# Patient Record
Sex: FEMALE
[De-identification: names, ages, dates, MRNs, and addresses within clinical notes are randomized per-mention and may not be internally consistent; named-entity substitution may affect disease eponyms.]

---

## 2017-02-22 ENCOUNTER — APPOINTMENT (OUTPATIENT)
Dept: GASTROENTEROLOGY | Facility: CLINIC | Age: 47
End: 2017-02-22

## 2017-11-24 ENCOUNTER — INPATIENT (INPATIENT)
Facility: HOSPITAL | Age: 47
LOS: 10 days | Discharge: HOME | DRG: 389 | End: 2017-12-05
Attending: SURGERY | Admitting: SURGERY
Payer: MEDICAID

## 2017-11-24 VITALS
RESPIRATION RATE: 20 BRPM | WEIGHT: 110.01 LBS | OXYGEN SATURATION: 100 % | DIASTOLIC BLOOD PRESSURE: 84 MMHG | HEIGHT: 62 IN | SYSTOLIC BLOOD PRESSURE: 128 MMHG | TEMPERATURE: 98 F | HEART RATE: 96 BPM

## 2017-11-24 DIAGNOSIS — Z90.49 ACQUIRED ABSENCE OF OTHER SPECIFIED PARTS OF DIGESTIVE TRACT: Chronic | ICD-10-CM

## 2017-11-24 LAB
ALBUMIN SERPL ELPH-MCNC: 4 G/DL — SIGNIFICANT CHANGE UP (ref 3.3–5)
ALP SERPL-CCNC: 67 U/L — SIGNIFICANT CHANGE UP (ref 40–120)
ALT FLD-CCNC: 9 U/L — LOW (ref 10–45)
ANION GAP SERPL CALC-SCNC: 14 MMOL/L — SIGNIFICANT CHANGE UP (ref 5–17)
APPEARANCE UR: CLEAR — SIGNIFICANT CHANGE UP
AST SERPL-CCNC: 13 U/L — SIGNIFICANT CHANGE UP (ref 10–40)
BASOPHILS NFR BLD AUTO: 0.2 % — SIGNIFICANT CHANGE UP (ref 0–2)
BILIRUB SERPL-MCNC: 0.3 MG/DL — SIGNIFICANT CHANGE UP (ref 0.2–1.2)
BILIRUB UR-MCNC: NEGATIVE — SIGNIFICANT CHANGE UP
BUN SERPL-MCNC: 17 MG/DL — SIGNIFICANT CHANGE UP (ref 7–23)
CALCIUM SERPL-MCNC: 9.4 MG/DL — SIGNIFICANT CHANGE UP (ref 8.4–10.5)
CHLORIDE SERPL-SCNC: 98 MMOL/L — SIGNIFICANT CHANGE UP (ref 96–108)
CO2 SERPL-SCNC: 23 MMOL/L — SIGNIFICANT CHANGE UP (ref 22–31)
COLOR SPEC: YELLOW — SIGNIFICANT CHANGE UP
CREAT SERPL-MCNC: 0.58 MG/DL — SIGNIFICANT CHANGE UP (ref 0.5–1.3)
DIFF PNL FLD: NEGATIVE — SIGNIFICANT CHANGE UP
EOSINOPHIL NFR BLD AUTO: 0.9 % — SIGNIFICANT CHANGE UP (ref 0–6)
GLUCOSE SERPL-MCNC: 210 MG/DL — HIGH (ref 70–99)
GLUCOSE UR QL: NEGATIVE — SIGNIFICANT CHANGE UP
HCG UR QL: NEGATIVE — SIGNIFICANT CHANGE UP
HCT VFR BLD CALC: 33.7 % — LOW (ref 34.5–45)
HGB BLD-MCNC: 10.7 G/DL — LOW (ref 11.5–15.5)
KETONES UR-MCNC: 15 MG/DL
LACTATE SERPL-SCNC: 0.9 MMOL/L — SIGNIFICANT CHANGE UP (ref 0.5–2)
LEUKOCYTE ESTERASE UR-ACNC: NEGATIVE — SIGNIFICANT CHANGE UP
LIDOCAIN IGE QN: 33 U/L — SIGNIFICANT CHANGE UP (ref 7–60)
LYMPHOCYTES # BLD AUTO: 9.2 % — LOW (ref 13–44)
MCHC RBC-ENTMCNC: 24.5 PG — LOW (ref 27–34)
MCHC RBC-ENTMCNC: 31.8 G/DL — LOW (ref 32–36)
MCV RBC AUTO: 77.3 FL — LOW (ref 80–100)
MONOCYTES NFR BLD AUTO: 5.9 % — SIGNIFICANT CHANGE UP (ref 2–14)
NEUTROPHILS NFR BLD AUTO: 83.8 % — HIGH (ref 43–77)
NITRITE UR-MCNC: NEGATIVE — SIGNIFICANT CHANGE UP
PH UR: 7.5 — SIGNIFICANT CHANGE UP (ref 5–8)
PLATELET # BLD AUTO: 265 K/UL — SIGNIFICANT CHANGE UP (ref 150–400)
POTASSIUM SERPL-MCNC: 3.6 MMOL/L — SIGNIFICANT CHANGE UP (ref 3.5–5.3)
POTASSIUM SERPL-SCNC: 3.6 MMOL/L — SIGNIFICANT CHANGE UP (ref 3.5–5.3)
PROT SERPL-MCNC: 7.7 G/DL — SIGNIFICANT CHANGE UP (ref 6–8.3)
PROT UR-MCNC: (no result) MG/DL
RBC # BLD: 4.36 M/UL — SIGNIFICANT CHANGE UP (ref 3.8–5.2)
RBC # FLD: 16.2 % — SIGNIFICANT CHANGE UP (ref 10.3–16.9)
SODIUM SERPL-SCNC: 135 MMOL/L — SIGNIFICANT CHANGE UP (ref 135–145)
SP GR SPEC: 1.02 — SIGNIFICANT CHANGE UP (ref 1–1.03)
UROBILINOGEN FLD QL: 0.2 E.U./DL — SIGNIFICANT CHANGE UP
WBC # BLD: 8.8 K/UL — SIGNIFICANT CHANGE UP (ref 3.8–10.5)
WBC # FLD AUTO: 8.8 K/UL — SIGNIFICANT CHANGE UP (ref 3.8–10.5)

## 2017-11-24 PROCEDURE — 74177 CT ABD & PELVIS W/CONTRAST: CPT | Mod: 26

## 2017-11-24 PROCEDURE — 99285 EMERGENCY DEPT VISIT HI MDM: CPT

## 2017-11-24 RX ORDER — IOHEXOL 300 MG/ML
50 INJECTION, SOLUTION INTRAVENOUS ONCE
Qty: 0 | Refills: 0 | Status: COMPLETED | OUTPATIENT
Start: 2017-11-24 | End: 2017-11-24

## 2017-11-24 RX ORDER — IOHEXOL 300 MG/ML
50 INJECTION, SOLUTION INTRAVENOUS ONCE
Qty: 0 | Refills: 0 | Status: DISCONTINUED | OUTPATIENT
Start: 2017-11-24 | End: 2017-11-24

## 2017-11-24 RX ORDER — HEPARIN SODIUM 5000 [USP'U]/ML
5000 INJECTION INTRAVENOUS; SUBCUTANEOUS EVERY 8 HOURS
Qty: 0 | Refills: 0 | Status: DISCONTINUED | OUTPATIENT
Start: 2017-11-24 | End: 2017-12-05

## 2017-11-24 RX ORDER — FAMOTIDINE 10 MG/ML
20 INJECTION INTRAVENOUS ONCE
Qty: 0 | Refills: 0 | Status: COMPLETED | OUTPATIENT
Start: 2017-11-24 | End: 2017-11-24

## 2017-11-24 RX ORDER — ONDANSETRON 8 MG/1
4 TABLET, FILM COATED ORAL EVERY 6 HOURS
Qty: 0 | Refills: 0 | Status: DISCONTINUED | OUTPATIENT
Start: 2017-11-24 | End: 2017-12-05

## 2017-11-24 RX ORDER — MORPHINE SULFATE 50 MG/1
4 CAPSULE, EXTENDED RELEASE ORAL ONCE
Qty: 0 | Refills: 0 | Status: DISCONTINUED | OUTPATIENT
Start: 2017-11-24 | End: 2017-11-24

## 2017-11-24 RX ORDER — LIDOCAINE 4 G/100G
10 CREAM TOPICAL ONCE
Qty: 0 | Refills: 0 | Status: COMPLETED | OUTPATIENT
Start: 2017-11-24 | End: 2017-11-24

## 2017-11-24 RX ORDER — HYDROMORPHONE HYDROCHLORIDE 2 MG/ML
0.5 INJECTION INTRAMUSCULAR; INTRAVENOUS; SUBCUTANEOUS EVERY 4 HOURS
Qty: 0 | Refills: 0 | Status: DISCONTINUED | OUTPATIENT
Start: 2017-11-24 | End: 2017-12-01

## 2017-11-24 RX ORDER — HYDROMORPHONE HYDROCHLORIDE 2 MG/ML
1 INJECTION INTRAMUSCULAR; INTRAVENOUS; SUBCUTANEOUS EVERY 4 HOURS
Qty: 0 | Refills: 0 | Status: DISCONTINUED | OUTPATIENT
Start: 2017-11-24 | End: 2017-12-01

## 2017-11-24 RX ORDER — SODIUM CHLORIDE 9 MG/ML
1000 INJECTION INTRAMUSCULAR; INTRAVENOUS; SUBCUTANEOUS ONCE
Qty: 0 | Refills: 0 | Status: COMPLETED | OUTPATIENT
Start: 2017-11-24 | End: 2017-11-24

## 2017-11-24 RX ORDER — SODIUM CHLORIDE 9 MG/ML
1000 INJECTION, SOLUTION INTRAVENOUS
Qty: 0 | Refills: 0 | Status: DISCONTINUED | OUTPATIENT
Start: 2017-11-24 | End: 2017-11-26

## 2017-11-24 RX ORDER — ONDANSETRON 8 MG/1
4 TABLET, FILM COATED ORAL ONCE
Qty: 0 | Refills: 0 | Status: COMPLETED | OUTPATIENT
Start: 2017-11-24 | End: 2017-11-24

## 2017-11-24 RX ORDER — MORPHINE SULFATE 50 MG/1
8 CAPSULE, EXTENDED RELEASE ORAL ONCE
Qty: 0 | Refills: 0 | Status: DISCONTINUED | OUTPATIENT
Start: 2017-11-24 | End: 2017-11-24

## 2017-11-24 RX ADMIN — IOHEXOL 50 MILLILITER(S): 300 INJECTION, SOLUTION INTRAVENOUS at 21:00

## 2017-11-24 RX ADMIN — HYDROMORPHONE HYDROCHLORIDE 1 MILLIGRAM(S): 2 INJECTION INTRAMUSCULAR; INTRAVENOUS; SUBCUTANEOUS at 23:00

## 2017-11-24 RX ADMIN — SODIUM CHLORIDE 100 MILLILITER(S): 9 INJECTION, SOLUTION INTRAVENOUS at 22:52

## 2017-11-24 RX ADMIN — ONDANSETRON 4 MILLIGRAM(S): 8 TABLET, FILM COATED ORAL at 16:31

## 2017-11-24 RX ADMIN — SODIUM CHLORIDE 1000 MILLILITER(S): 9 INJECTION INTRAMUSCULAR; INTRAVENOUS; SUBCUTANEOUS at 16:25

## 2017-11-24 RX ADMIN — LIDOCAINE 10 MILLILITER(S): 4 CREAM TOPICAL at 16:31

## 2017-11-24 RX ADMIN — MORPHINE SULFATE 4 MILLIGRAM(S): 50 CAPSULE, EXTENDED RELEASE ORAL at 16:31

## 2017-11-24 RX ADMIN — MORPHINE SULFATE 4 MILLIGRAM(S): 50 CAPSULE, EXTENDED RELEASE ORAL at 20:59

## 2017-11-24 RX ADMIN — Medication 30 MILLILITER(S): at 16:31

## 2017-11-24 RX ADMIN — FAMOTIDINE 20 MILLIGRAM(S): 10 INJECTION INTRAVENOUS at 16:31

## 2017-11-24 RX ADMIN — HYDROMORPHONE HYDROCHLORIDE 1 MILLIGRAM(S): 2 INJECTION INTRAMUSCULAR; INTRAVENOUS; SUBCUTANEOUS at 22:07

## 2017-11-24 RX ADMIN — MORPHINE SULFATE 8 MILLIGRAM(S): 50 CAPSULE, EXTENDED RELEASE ORAL at 20:59

## 2017-11-24 RX ADMIN — MORPHINE SULFATE 8 MILLIGRAM(S): 50 CAPSULE, EXTENDED RELEASE ORAL at 17:17

## 2017-11-24 RX ADMIN — HEPARIN SODIUM 5000 UNIT(S): 5000 INJECTION INTRAVENOUS; SUBCUTANEOUS at 22:52

## 2017-11-24 NOTE — ED ADULT NURSE NOTE - OBJECTIVE STATEMENT
Pt presents to ED c/o abdominal pain since today. Pt endorses 9/10 periumbilical pain starting 12 hours ago, pt endorses associated nausea no vomiting,. Pt presents to ED c/o abdominal pain since today. Pt with PMH colectomy and x2 SBOs presents today endorses 9/10 medial abdominal pain, sharp, constant since last night. Pt endorses nausea, no vomiting. Pt with last BM yesterday normal per pt, none today. Pt denies fever, chills, CP, SOB, difficulty urinating, blood in urine, vaginal bleeding. Pt presents anxious but in NAD ambulatory through triage.

## 2017-11-24 NOTE — ED PROVIDER NOTE - PHYSICAL EXAMINATION
VITAL SIGNS: I have reviewed nursing notes and confirm.  CONSTITUTIONAL: Well-developed; well-nourished; in no acute distress.  SKIN: Agree with RN documentation regarding decubitus evaluation. Remainder of skin exam is warm and dry, no acute rash.  HEAD: Normocephalic; atraumatic.  EYES: PERRL, EOM intact; conjunctiva and sclera clear.  ENT: No nasal discharge; airway clear.  NECK: Supple; non tender.  CARD: S1, S2 normal; no murmurs, gallops, or rubs. Regular rate and rhythm.  RESP: No wheezes, rales or rhonchi.  ABD: Normal bowel sounds; soft; + mild distention, + TTP mid-epigastrium w/guarding, no rigidity, no mcburney's pt TTP, no fluid wave  EXT: Normal ROM. No clubbing, cyanosis or edema.  LYMPH: No acute cervical adenopathy.  NEURO: Alert, oriented. Grossly unremarkable.  PSYCH: Cooperative, appropriate.

## 2017-11-24 NOTE — H&P ADULT - HISTORY OF PRESENT ILLNESS
46 yo F with hx of colonic resection 4 years ago for unknown infection, hx of SBOs x 2, presents with LUQ pain since last night. The pain is upper midline to LUQ and she had had pain in a similar location before but never this severe. The pain hasn't travelled. She's had a small amount of nausea but no vomiting. She is not currently nauseated. She is still passing gas and having bowel movement. No fever/chills. Her surgery was in [place]. 46 yo F with hx of colonic resection 4 years ago for unknown infection, hx of SBOs x 2, presents with LUQ pain since last night. The pain is upper midline to LUQ and she had had pain in a similar location before but never this severe. The pain hasn't travelled. She's had a small amount of nausea but no vomiting. She is not currently nauseated. She is still passing gas and having bowel movement. No fever/chills. Her surgery was in [place]. She denies any history of malignancy.

## 2017-11-24 NOTE — H&P ADULT - ASSESSMENT
48 yo F with hx left hemicolectomy presents with LUQ pain and is found to have narrowing at the level of the sigmoid.   Admit to regional  NPO/IVF  Pain/nausea control  SCD/SQH  Repeat CT scan with PO and rectal contrast- discussed case in person with radiologists. Concern could be a possible mass vs. possible inflammatory process.   OOBA/IS    Patient seen and discussed with chief.

## 2017-11-24 NOTE — H&P ADULT - NSHPLABSRESULTS_GEN_ALL_CORE
10.7   8.8   )-----------( 265      ( 24 Nov 2017 17:10 )             33.7   24 Nov 2017 17:10    135    |  98     |  17     ----------------------------<  210    3.6     |  23     |  0.58     Ca    9.4        24 Nov 2017 17:10    TPro  7.7    /  Alb  4.0    /  TBili  0.3    /  DBili  x      /  AST  13     /  ALT  9      /  AlkPhos  67     24 Nov 2017 17:10    < from: CT Abdomen and Pelvis w/ IV Cont (11.24.17 @ 19:48) >      ******PRELIMINARY REPORT******    ******PRELIMINARY REPORT******            EXAM:  CT ABDOMEN AND PELVIS IC                          PROCEDURE DATE:  11/24/2017        CT of the ABDOMEN and PELVIS with intravenous contrast dated 11/24/2017   7:48 PM    FINDINGS: Images of the lower chest demonstrate mild bibasilar dependent   atelectasis.    There is a 3 mm hypoattenuating lesion in hepatic segment VII that is too   small to further characterize. No radiopaque stones are seen within the   gallbladder. There is 10 mm dilatation of the common bile duct. The   pancreatic duct is dilated measuring up to 5 mm at the pancreatic head.   The pancreas is normal in appearance. No splenic abnormalities are seen.    The adrenal glands are unremarkable. Subcentimeter hypoattenuating   lesions in both kidneys are too small to further characterize. No   hydronephrosis or nephrolithiasis.    No abdominal aortic aneurysm is seen. No lymphadenopathy is seen.    Evaluation of the bowel demonstrates patient to be status post left   hemicolectomy with small bowel-to-colonic anastomosis in the right upper   quadrant. There is narrowing of the sigmoid colon with proximal   distention which may be secondary to underdistention, although   mass/stricture cannot be excluded. Proximal bowel is intermittently fluid   and air-filled measuring up to 3.7 cm within small bowel loops of the   right lower quadrant. There is small bowel wall thickening in the left   upper quadrant which may represent underdistention versus enteritis. This   constellation of findings may suggest sequelae of inflammatory bowel   disease and correlation with patient history is recommended.    Images of the pelvis demonstrate the uterus to be retroverted and lobular   in contour, likely related to fibroids. No adnexal masses are seen.    Evaluation of the osseous structures demonstrates mild degenerative   changes.      IMPRESSION:  1.  Status post left hemicolectomy with small bowel to colonic   anastomosis. There is large and small bowel dilatation proximal to an   area of narrowing within the sigmoid colon which may be secondary to   underdistention although mass/stricture cannot be excluded. Apparent   jejunal wall thickening in theleft upper quadrant may represent   additional underdistention versus enteritis. Repeat examination with oral   and rectal contrast may be of use for further evaluation.  2.  Dilatation of the common bile duct as well as pancreatic duct. ERCP   or MRCP may be of use for further evaluation.    The above results were discussed with Dr. Sharda Capone on 11/24/2017   at 8:09 PM.        ******PRELIMINARY REPORT******       PADMINI SALDIVAR M.D., RADIOLOGY RESIDENT                        < end of copied text >

## 2017-11-24 NOTE — ED PROVIDER NOTE - MEDICAL DECISION MAKING DETAILS
+ SBO on CT though there are several potential sites of obstruction, pt to receive triple contrast CT in order to further delineate strictures/lesions of colon. Pain well controlled in ED, admitting to surgical service under Dr. He.

## 2017-11-24 NOTE — H&P ADULT - NSHPPHYSICALEXAM_GEN_ALL_CORE
Anxious due to pain  Breathing nonlabored  Abdomen soft, ttp along superior incision and in LUQ. No hernias or fascial defects palpated.   Extremities warm and well-perfused

## 2017-11-24 NOTE — ED PROVIDER NOTE - OBJECTIVE STATEMENT
48 y/o F w/hx bowel resection many years ago for ?infection (exact diagnosis unknown to pt) w/2 SBO's in the past managed medically, p/w abd distention, pain, and nausea for past 12 hrs. Pain is 10/10 angela-umbilical non-radiating sharp pain. No back of flank pain. Discussed with Dr. [place] on the phone and told to report to the ed for concerns of SBO. States she had BM yesterday, small amount of flatus today. No urinary sx. No fever/chills. No CP/SOB/palpitations. No vaginal complaints/bleeding.

## 2017-11-25 LAB
ALBUMIN SERPL ELPH-MCNC: 3.5 G/DL — SIGNIFICANT CHANGE UP (ref 3.3–5)
ALP SERPL-CCNC: 54 U/L — SIGNIFICANT CHANGE UP (ref 40–120)
ALT FLD-CCNC: 7 U/L — LOW (ref 10–45)
ANION GAP SERPL CALC-SCNC: 15 MMOL/L — SIGNIFICANT CHANGE UP (ref 5–17)
AST SERPL-CCNC: 12 U/L — SIGNIFICANT CHANGE UP (ref 10–40)
BILIRUB SERPL-MCNC: 0.3 MG/DL — SIGNIFICANT CHANGE UP (ref 0.2–1.2)
BUN SERPL-MCNC: 13 MG/DL — SIGNIFICANT CHANGE UP (ref 7–23)
CALCIUM SERPL-MCNC: 8.1 MG/DL — LOW (ref 8.4–10.5)
CHLORIDE SERPL-SCNC: 103 MMOL/L — SIGNIFICANT CHANGE UP (ref 96–108)
CO2 SERPL-SCNC: 21 MMOL/L — LOW (ref 22–31)
CREAT SERPL-MCNC: 0.5 MG/DL — SIGNIFICANT CHANGE UP (ref 0.5–1.3)
CULTURE RESULTS: NO GROWTH — SIGNIFICANT CHANGE UP
GLUCOSE SERPL-MCNC: 79 MG/DL — SIGNIFICANT CHANGE UP (ref 70–99)
HCT VFR BLD CALC: 27.6 % — LOW (ref 34.5–45)
HGB BLD-MCNC: 8.8 G/DL — LOW (ref 11.5–15.5)
MAGNESIUM SERPL-MCNC: 2.1 MG/DL — SIGNIFICANT CHANGE UP (ref 1.6–2.6)
MCHC RBC-ENTMCNC: 24.6 PG — LOW (ref 27–34)
MCHC RBC-ENTMCNC: 31.9 G/DL — LOW (ref 32–36)
MCV RBC AUTO: 77.3 FL — LOW (ref 80–100)
PHOSPHATE SERPL-MCNC: 2.8 MG/DL — SIGNIFICANT CHANGE UP (ref 2.5–4.5)
PLATELET # BLD AUTO: 219 K/UL — SIGNIFICANT CHANGE UP (ref 150–400)
POTASSIUM SERPL-MCNC: 3.1 MMOL/L — LOW (ref 3.5–5.3)
POTASSIUM SERPL-SCNC: 3.1 MMOL/L — LOW (ref 3.5–5.3)
PROT SERPL-MCNC: 6.1 G/DL — SIGNIFICANT CHANGE UP (ref 6–8.3)
RBC # BLD: 3.57 M/UL — LOW (ref 3.8–5.2)
RBC # FLD: 16.3 % — SIGNIFICANT CHANGE UP (ref 10.3–16.9)
SODIUM SERPL-SCNC: 139 MMOL/L — SIGNIFICANT CHANGE UP (ref 135–145)
SPECIMEN SOURCE: SIGNIFICANT CHANGE UP
WBC # BLD: 4.7 K/UL — SIGNIFICANT CHANGE UP (ref 3.8–10.5)
WBC # FLD AUTO: 4.7 K/UL — SIGNIFICANT CHANGE UP (ref 3.8–10.5)

## 2017-11-25 PROCEDURE — 74177 CT ABD & PELVIS W/CONTRAST: CPT | Mod: 26

## 2017-11-25 RX ORDER — HYDROMORPHONE HYDROCHLORIDE 2 MG/ML
1 INJECTION INTRAMUSCULAR; INTRAVENOUS; SUBCUTANEOUS ONCE
Qty: 0 | Refills: 0 | Status: DISCONTINUED | OUTPATIENT
Start: 2017-11-25 | End: 2017-11-25

## 2017-11-25 RX ORDER — DIPHENHYDRAMINE HCL 50 MG
50 CAPSULE ORAL EVERY 6 HOURS
Qty: 0 | Refills: 0 | Status: DISCONTINUED | OUTPATIENT
Start: 2017-11-25 | End: 2017-11-25

## 2017-11-25 RX ORDER — DIATRIZOATE MEGLUMINE 180 MG/ML
30 INJECTION, SOLUTION INTRAVESICAL ONCE
Qty: 0 | Refills: 0 | Status: COMPLETED | OUTPATIENT
Start: 2017-11-25 | End: 2017-11-25

## 2017-11-25 RX ORDER — DIPHENHYDRAMINE HCL 50 MG
25 CAPSULE ORAL ONCE
Qty: 0 | Refills: 0 | Status: COMPLETED | OUTPATIENT
Start: 2017-11-25 | End: 2017-11-25

## 2017-11-25 RX ORDER — POTASSIUM CHLORIDE 20 MEQ
10 PACKET (EA) ORAL
Qty: 0 | Refills: 0 | Status: COMPLETED | OUTPATIENT
Start: 2017-11-25 | End: 2017-11-25

## 2017-11-25 RX ORDER — DIPHENHYDRAMINE HCL 50 MG
50 CAPSULE ORAL ONCE
Qty: 0 | Refills: 0 | Status: COMPLETED | OUTPATIENT
Start: 2017-11-25 | End: 2017-11-25

## 2017-11-25 RX ORDER — HYDROMORPHONE HYDROCHLORIDE 2 MG/ML
0.5 INJECTION INTRAMUSCULAR; INTRAVENOUS; SUBCUTANEOUS ONCE
Qty: 0 | Refills: 0 | Status: DISCONTINUED | OUTPATIENT
Start: 2017-11-25 | End: 2017-11-25

## 2017-11-25 RX ORDER — POTASSIUM PHOSPHATE, MONOBASIC POTASSIUM PHOSPHATE, DIBASIC 236; 224 MG/ML; MG/ML
15 INJECTION, SOLUTION INTRAVENOUS ONCE
Qty: 0 | Refills: 0 | Status: COMPLETED | OUTPATIENT
Start: 2017-11-25 | End: 2017-11-25

## 2017-11-25 RX ADMIN — HYDROMORPHONE HYDROCHLORIDE 1 MILLIGRAM(S): 2 INJECTION INTRAMUSCULAR; INTRAVENOUS; SUBCUTANEOUS at 16:15

## 2017-11-25 RX ADMIN — HYDROMORPHONE HYDROCHLORIDE 1 MILLIGRAM(S): 2 INJECTION INTRAMUSCULAR; INTRAVENOUS; SUBCUTANEOUS at 23:59

## 2017-11-25 RX ADMIN — HEPARIN SODIUM 5000 UNIT(S): 5000 INJECTION INTRAVENOUS; SUBCUTANEOUS at 05:50

## 2017-11-25 RX ADMIN — SODIUM CHLORIDE 100 MILLILITER(S): 9 INJECTION, SOLUTION INTRAVENOUS at 18:58

## 2017-11-25 RX ADMIN — HYDROMORPHONE HYDROCHLORIDE 1 MILLIGRAM(S): 2 INJECTION INTRAMUSCULAR; INTRAVENOUS; SUBCUTANEOUS at 01:01

## 2017-11-25 RX ADMIN — HYDROMORPHONE HYDROCHLORIDE 1 MILLIGRAM(S): 2 INJECTION INTRAMUSCULAR; INTRAVENOUS; SUBCUTANEOUS at 12:25

## 2017-11-25 RX ADMIN — HYDROMORPHONE HYDROCHLORIDE 0.5 MILLIGRAM(S): 2 INJECTION INTRAMUSCULAR; INTRAVENOUS; SUBCUTANEOUS at 03:12

## 2017-11-25 RX ADMIN — HYDROMORPHONE HYDROCHLORIDE 1 MILLIGRAM(S): 2 INJECTION INTRAMUSCULAR; INTRAVENOUS; SUBCUTANEOUS at 00:19

## 2017-11-25 RX ADMIN — HEPARIN SODIUM 5000 UNIT(S): 5000 INJECTION INTRAVENOUS; SUBCUTANEOUS at 22:00

## 2017-11-25 RX ADMIN — Medication 100 MILLIEQUIVALENT(S): at 16:51

## 2017-11-25 RX ADMIN — HYDROMORPHONE HYDROCHLORIDE 0.5 MILLIGRAM(S): 2 INJECTION INTRAMUSCULAR; INTRAVENOUS; SUBCUTANEOUS at 03:02

## 2017-11-25 RX ADMIN — HYDROMORPHONE HYDROCHLORIDE 1 MILLIGRAM(S): 2 INJECTION INTRAMUSCULAR; INTRAVENOUS; SUBCUTANEOUS at 19:59

## 2017-11-25 RX ADMIN — HEPARIN SODIUM 5000 UNIT(S): 5000 INJECTION INTRAVENOUS; SUBCUTANEOUS at 13:12

## 2017-11-25 RX ADMIN — Medication 100 MILLIEQUIVALENT(S): at 10:10

## 2017-11-25 RX ADMIN — HYDROMORPHONE HYDROCHLORIDE 1 MILLIGRAM(S): 2 INJECTION INTRAMUSCULAR; INTRAVENOUS; SUBCUTANEOUS at 08:40

## 2017-11-25 RX ADMIN — HYDROMORPHONE HYDROCHLORIDE 1 MILLIGRAM(S): 2 INJECTION INTRAMUSCULAR; INTRAVENOUS; SUBCUTANEOUS at 08:28

## 2017-11-25 RX ADMIN — POTASSIUM PHOSPHATE, MONOBASIC POTASSIUM PHOSPHATE, DIBASIC 62.5 MILLIMOLE(S): 236; 224 INJECTION, SOLUTION INTRAVENOUS at 18:53

## 2017-11-25 RX ADMIN — Medication 50 MILLIGRAM(S): at 16:58

## 2017-11-25 RX ADMIN — HYDROMORPHONE HYDROCHLORIDE 1 MILLIGRAM(S): 2 INJECTION INTRAMUSCULAR; INTRAVENOUS; SUBCUTANEOUS at 12:07

## 2017-11-25 RX ADMIN — Medication 100 MILLIEQUIVALENT(S): at 11:53

## 2017-11-25 RX ADMIN — HYDROMORPHONE HYDROCHLORIDE 1 MILLIGRAM(S): 2 INJECTION INTRAMUSCULAR; INTRAVENOUS; SUBCUTANEOUS at 20:15

## 2017-11-25 RX ADMIN — Medication 25 MILLIGRAM(S): at 03:03

## 2017-11-25 RX ADMIN — ONDANSETRON 4 MILLIGRAM(S): 8 TABLET, FILM COATED ORAL at 10:14

## 2017-11-25 RX ADMIN — HYDROMORPHONE HYDROCHLORIDE 1 MILLIGRAM(S): 2 INJECTION INTRAMUSCULAR; INTRAVENOUS; SUBCUTANEOUS at 17:03

## 2017-11-25 NOTE — PROGRESS NOTE ADULT - SUBJECTIVE AND OBJECTIVE BOX
ON: HERBERT CARPIO  11/24: patient having abdominal pain and copious diarrhea ON: HERBERT CARPIO  : patient having abdominal pain and copious diarrhea    SUBJECTIVE: Patient seen and examined bedside by chief resident. Having intermittent diarrhea, c/o pain over the LLQ, no nausea/vomiting    heparin  Injectable 5000 Unit(s) SubCutaneous every 8 hours      Vital Signs Last 24 Hrs  T(C): 36.7 (2017 05:42), Max: 37.2 (2017 20:42)  T(F): 98.1 (2017 05:42), Max: 98.9 (2017 20:42)  HR: 75 (2017 05:42) (75 - 96)  BP: 102/67 (2017 05:42) (93/59 - 135/68)  BP(mean): --  RR: 16 (2017 05:42) (16 - 20)  SpO2: 100% (2017 05:42) (100% - 100%)  I&O's Detail    2017 07:01  -  2017 07:00  --------------------------------------------------------  IN:    lactated ringers.: 1000 mL    Oral Fluid: 150 mL  Total IN: 1150 mL    OUT:    Voided: 300 mL  Total OUT: 300 mL    Total NET: 850 mL          General: NAD, resting comfortably in bed  C/V: NSR  Pulm: Nonlabored breathing, no respiratory distress  Abd: soft, ND, tender at LLQ  Extrem: WWP, no edema, SCDs in place        LABS:                        8.8    4.7   )-----------( 219      ( 2017 06:13 )             27.6     11-25    139  |  103  |  13  ----------------------------<  79  3.1<L>   |  21<L>  |  0.50    Ca    8.1<L>      2017 06:13  Phos  2.8       Mg     2.1         TPro  6.1  /  Alb  3.5  /  TBili  0.3  /  DBili  x   /  AST  12  /  ALT  7<L>  /  AlkPhos  54  11-25      Urinalysis Basic - ( 2017 18:13 )    Color: Yellow / Appearance: Clear / S.020 / pH: x  Gluc: x / Ketone: 15 mg/dL  / Bili: Negative / Urobili: 0.2 E.U./dL   Blood: x / Protein: Trace mg/dL / Nitrite: NEGATIVE   Leuk Esterase: NEGATIVE / RBC: < 5 /HPF / WBC < 5 /HPF   Sq Epi: x / Non Sq Epi: 0-5 /HPF / Bacteria: Present /HPF        RADIOLOGY & ADDITIONAL STUDIES:

## 2017-11-25 NOTE — PROGRESS NOTE ADULT - ASSESSMENT
48 yo F with hx left hemicolectomy presents with LUQ pain and is found to have narrowing at the level of the sigmoid.     1. Sigmoid stricture possible 2/2 Crohn's Disease  - 11/25: pending CT AP w/ IV, PO, rectal contrast  - f/u outside hospital medical records    2. Hx of SBO  - currently passing flatus and BM.    3. Hx of left hemicolectomy  - adhesions from surgery likely cause of recurrent SBOs    4. Dispo: home

## 2017-11-26 LAB
ALBUMIN SERPL ELPH-MCNC: 3.4 G/DL — SIGNIFICANT CHANGE UP (ref 3.3–5)
ANION GAP SERPL CALC-SCNC: 15 MMOL/L — SIGNIFICANT CHANGE UP (ref 5–17)
BUN SERPL-MCNC: 10 MG/DL — SIGNIFICANT CHANGE UP (ref 7–23)
CALCIUM SERPL-MCNC: 8.3 MG/DL — LOW (ref 8.4–10.5)
CHLORIDE SERPL-SCNC: 103 MMOL/L — SIGNIFICANT CHANGE UP (ref 96–108)
CO2 SERPL-SCNC: 22 MMOL/L — SIGNIFICANT CHANGE UP (ref 22–31)
CREAT SERPL-MCNC: 0.51 MG/DL — SIGNIFICANT CHANGE UP (ref 0.5–1.3)
GLUCOSE SERPL-MCNC: 82 MG/DL — SIGNIFICANT CHANGE UP (ref 70–99)
HCT VFR BLD CALC: 28 % — LOW (ref 34.5–45)
HGB BLD-MCNC: 8.7 G/DL — LOW (ref 11.5–15.5)
MAGNESIUM SERPL-MCNC: 2.2 MG/DL — SIGNIFICANT CHANGE UP (ref 1.6–2.6)
MCHC RBC-ENTMCNC: 24.3 PG — LOW (ref 27–34)
MCHC RBC-ENTMCNC: 31.1 G/DL — LOW (ref 32–36)
MCV RBC AUTO: 78.2 FL — LOW (ref 80–100)
PHOSPHATE SERPL-MCNC: 3.4 MG/DL — SIGNIFICANT CHANGE UP (ref 2.5–4.5)
PLATELET # BLD AUTO: 243 K/UL — SIGNIFICANT CHANGE UP (ref 150–400)
POTASSIUM SERPL-MCNC: 3.3 MMOL/L — LOW (ref 3.5–5.3)
POTASSIUM SERPL-SCNC: 3.3 MMOL/L — LOW (ref 3.5–5.3)
PREALB SERPL-MCNC: 13 MG/DL — LOW (ref 20–40)
RBC # BLD: 3.58 M/UL — LOW (ref 3.8–5.2)
RBC # FLD: 16 % — SIGNIFICANT CHANGE UP (ref 10.3–16.9)
SODIUM SERPL-SCNC: 140 MMOL/L — SIGNIFICANT CHANGE UP (ref 135–145)
WBC # BLD: 4 K/UL — SIGNIFICANT CHANGE UP (ref 3.8–10.5)
WBC # FLD AUTO: 4 K/UL — SIGNIFICANT CHANGE UP (ref 3.8–10.5)

## 2017-11-26 PROCEDURE — 74020: CPT | Mod: 26

## 2017-11-26 RX ORDER — SODIUM CHLORIDE 9 MG/ML
1000 INJECTION, SOLUTION INTRAVENOUS
Qty: 0 | Refills: 0 | Status: DISCONTINUED | OUTPATIENT
Start: 2017-11-26 | End: 2017-11-27

## 2017-11-26 RX ORDER — DIPHENHYDRAMINE HCL 50 MG
50 CAPSULE ORAL ONCE
Qty: 0 | Refills: 0 | Status: COMPLETED | OUTPATIENT
Start: 2017-11-26 | End: 2017-11-26

## 2017-11-26 RX ORDER — POTASSIUM CHLORIDE 20 MEQ
10 PACKET (EA) ORAL
Qty: 0 | Refills: 0 | Status: DISCONTINUED | OUTPATIENT
Start: 2017-11-26 | End: 2017-11-26

## 2017-11-26 RX ORDER — POTASSIUM CHLORIDE 20 MEQ
10 PACKET (EA) ORAL
Qty: 0 | Refills: 0 | Status: COMPLETED | OUTPATIENT
Start: 2017-11-26 | End: 2017-11-26

## 2017-11-26 RX ADMIN — HYDROMORPHONE HYDROCHLORIDE 1 MILLIGRAM(S): 2 INJECTION INTRAMUSCULAR; INTRAVENOUS; SUBCUTANEOUS at 12:50

## 2017-11-26 RX ADMIN — HYDROMORPHONE HYDROCHLORIDE 1 MILLIGRAM(S): 2 INJECTION INTRAMUSCULAR; INTRAVENOUS; SUBCUTANEOUS at 12:29

## 2017-11-26 RX ADMIN — HEPARIN SODIUM 5000 UNIT(S): 5000 INJECTION INTRAVENOUS; SUBCUTANEOUS at 06:00

## 2017-11-26 RX ADMIN — HYDROMORPHONE HYDROCHLORIDE 1 MILLIGRAM(S): 2 INJECTION INTRAMUSCULAR; INTRAVENOUS; SUBCUTANEOUS at 04:25

## 2017-11-26 RX ADMIN — HYDROMORPHONE HYDROCHLORIDE 1 MILLIGRAM(S): 2 INJECTION INTRAMUSCULAR; INTRAVENOUS; SUBCUTANEOUS at 08:40

## 2017-11-26 RX ADMIN — SODIUM CHLORIDE 100 MILLILITER(S): 9 INJECTION, SOLUTION INTRAVENOUS at 19:00

## 2017-11-26 RX ADMIN — Medication 100 MILLIEQUIVALENT(S): at 19:14

## 2017-11-26 RX ADMIN — HEPARIN SODIUM 5000 UNIT(S): 5000 INJECTION INTRAVENOUS; SUBCUTANEOUS at 15:00

## 2017-11-26 RX ADMIN — HYDROMORPHONE HYDROCHLORIDE 1 MILLIGRAM(S): 2 INJECTION INTRAMUSCULAR; INTRAVENOUS; SUBCUTANEOUS at 08:25

## 2017-11-26 RX ADMIN — Medication 100 MILLIEQUIVALENT(S): at 14:59

## 2017-11-26 RX ADMIN — HEPARIN SODIUM 5000 UNIT(S): 5000 INJECTION INTRAVENOUS; SUBCUTANEOUS at 22:00

## 2017-11-26 RX ADMIN — ONDANSETRON 4 MILLIGRAM(S): 8 TABLET, FILM COATED ORAL at 04:28

## 2017-11-26 RX ADMIN — HYDROMORPHONE HYDROCHLORIDE 1 MILLIGRAM(S): 2 INJECTION INTRAMUSCULAR; INTRAVENOUS; SUBCUTANEOUS at 00:15

## 2017-11-26 RX ADMIN — Medication 50 MILLIGRAM(S): at 12:50

## 2017-11-26 RX ADMIN — Medication 100 MILLIEQUIVALENT(S): at 16:53

## 2017-11-26 RX ADMIN — HYDROMORPHONE HYDROCHLORIDE 1 MILLIGRAM(S): 2 INJECTION INTRAMUSCULAR; INTRAVENOUS; SUBCUTANEOUS at 16:50

## 2017-11-26 RX ADMIN — HYDROMORPHONE HYDROCHLORIDE 1 MILLIGRAM(S): 2 INJECTION INTRAMUSCULAR; INTRAVENOUS; SUBCUTANEOUS at 16:29

## 2017-11-26 RX ADMIN — HYDROMORPHONE HYDROCHLORIDE 1 MILLIGRAM(S): 2 INJECTION INTRAMUSCULAR; INTRAVENOUS; SUBCUTANEOUS at 21:27

## 2017-11-26 RX ADMIN — HYDROMORPHONE HYDROCHLORIDE 1 MILLIGRAM(S): 2 INJECTION INTRAMUSCULAR; INTRAVENOUS; SUBCUTANEOUS at 21:12

## 2017-11-26 RX ADMIN — SODIUM CHLORIDE 100 MILLILITER(S): 9 INJECTION, SOLUTION INTRAVENOUS at 05:44

## 2017-11-26 RX ADMIN — HYDROMORPHONE HYDROCHLORIDE 1 MILLIGRAM(S): 2 INJECTION INTRAMUSCULAR; INTRAVENOUS; SUBCUTANEOUS at 04:42

## 2017-11-26 NOTE — PROGRESS NOTE ADULT - ASSESSMENT
46 yo F with hx left hemicolectomy presents with LUQ pain and is found to have narrowing at the level of the sigmoid.     1. Sigmoid stricture possible 2/2 Crohn's Disease  - 11/25: pending result CT AP w/ IV, PO, rectal contrast  - f/u outside hospital medical records    2. Hx of SBO  - currently passing flatus and BM.    3. Hx of left hemicolectomy  - adhesions from surgery likely cause of recurrent SBOs

## 2017-11-26 NOTE — PROGRESS NOTE ADULT - SUBJECTIVE AND OBJECTIVE BOX
ON : Still having some pain, no nausea/vomiting, no bowel function today  11/25 : CT scan with PO/IV/Rectal contrast done. No nausea/vomiting. ON : Still having some pain, no nausea/vomiting, no bowel function today   : CT scan with PO/IV/Rectal contrast done. No nausea/vomiting.         Vital Signs Last 24 Hrs  T(C): 36.7 (2017 08:25), Max: 37.2 (2017 14:52)  T(F): 98.1 (2017 08:25), Max: 98.9 (2017 14:52)  HR: 70 (2017 08:) (69 - 79)  BP: 106/70 (2017 08:25) (105/61 - 113/73)  BP(mean): --  RR: 14 (2017 08:) (14 - 17)  SpO2: 100% (2017 08:25) (98% - 100%)    I&O's Summary    2017 07:  -  2017 07:00  --------------------------------------------------------  IN: 2422.5 mL / OUT: 900 mL / NET: 1522.5 mL    2017 07:  -  2017 13:54  --------------------------------------------------------  IN: 500 mL / OUT: 0 mL / NET: 500 mL        SUBJECTIVE: Pt seen and examined at bedside. Increasing L epigastric pain. Diarrhea resolved.     Pain: [x ] YES [ ] NO  Pain (0-10):              Pain Control Adequate: [x ] YES [ ] NO  SOB: [ ]YES [x ] NO  Chest Discomfort: [ ] YES [x ] NO    Nausea: [ ] YES [x ] NO           Vomiting: [ ] YES [x ] NO  Flatus: [ ] YES [x ] NO             Bowel Movement: [ ] YES [x ] NO     Void: [x ]YES [ ]No    Physical Exam:  General Appearance: Appears well, NAD  Neck: Supple  Chest: Equal expansion bilaterally, equal breath sounds  CV: Pulse regular presently  Abdomen: Soft, nontense, epigastric tenderness.   Extremities: Grossly symmetric, SCD's in place     LABS:                        8.7    4.0   )-----------( 243      ( 2017 06:32 )             28.0         140  |  103  |  10  ----------------------------<  82  3.3<L>   |  22  |  0.51    Ca    8.3<L>      2017 06:32  Phos  3.4       Mg     2.2         TPro  6.1  /  Alb  3.5  /  TBili  0.3  /  DBili  x   /  AST  12  /  ALT  7<L>  /  AlkPhos  54        Urinalysis Basic - ( 2017 18:13 )    Color: Yellow / Appearance: Clear / S.020 / pH: x  Gluc: x / Ketone: 15 mg/dL  / Bili: Negative / Urobili: 0.2 E.U./dL   Blood: x / Protein: Trace mg/dL / Nitrite: NEGATIVE   Leuk Esterase: NEGATIVE / RBC: < 5 /HPF / WBC < 5 /HPF   Sq Epi: x / Non Sq Epi: 0-5 /HPF / Bacteria: Present /HPF        RADIOLOGY & ADDITIONAL STUDIES:

## 2017-11-27 LAB
ANION GAP SERPL CALC-SCNC: 14 MMOL/L — SIGNIFICANT CHANGE UP (ref 5–17)
APTT BLD: 37.4 SEC — SIGNIFICANT CHANGE UP (ref 27.5–37.4)
BLD GP AB SCN SERPL QL: NEGATIVE — SIGNIFICANT CHANGE UP
BUN SERPL-MCNC: 4 MG/DL — LOW (ref 7–23)
CALCIUM SERPL-MCNC: 8.7 MG/DL — SIGNIFICANT CHANGE UP (ref 8.4–10.5)
CHLORIDE SERPL-SCNC: 103 MMOL/L — SIGNIFICANT CHANGE UP (ref 96–108)
CO2 SERPL-SCNC: 23 MMOL/L — SIGNIFICANT CHANGE UP (ref 22–31)
CREAT SERPL-MCNC: 0.54 MG/DL — SIGNIFICANT CHANGE UP (ref 0.5–1.3)
GLUCOSE SERPL-MCNC: 91 MG/DL — SIGNIFICANT CHANGE UP (ref 70–99)
HCT VFR BLD CALC: 28.3 % — LOW (ref 34.5–45)
HGB BLD-MCNC: 8.8 G/DL — LOW (ref 11.5–15.5)
INR BLD: 1.07 — SIGNIFICANT CHANGE UP (ref 0.88–1.16)
MAGNESIUM SERPL-MCNC: 2.2 MG/DL — SIGNIFICANT CHANGE UP (ref 1.6–2.6)
MCHC RBC-ENTMCNC: 24.3 PG — LOW (ref 27–34)
MCHC RBC-ENTMCNC: 31.1 G/DL — LOW (ref 32–36)
MCV RBC AUTO: 78.2 FL — LOW (ref 80–100)
PHOSPHATE SERPL-MCNC: 3.4 MG/DL — SIGNIFICANT CHANGE UP (ref 2.5–4.5)
PLATELET # BLD AUTO: 254 K/UL — SIGNIFICANT CHANGE UP (ref 150–400)
POTASSIUM SERPL-MCNC: 4 MMOL/L — SIGNIFICANT CHANGE UP (ref 3.5–5.3)
POTASSIUM SERPL-SCNC: 4 MMOL/L — SIGNIFICANT CHANGE UP (ref 3.5–5.3)
PROTHROM AB SERPL-ACNC: 11.9 SEC — SIGNIFICANT CHANGE UP (ref 9.8–12.7)
RBC # BLD: 3.62 M/UL — LOW (ref 3.8–5.2)
RBC # FLD: 15.9 % — SIGNIFICANT CHANGE UP (ref 10.3–16.9)
RH IG SCN BLD-IMP: POSITIVE — SIGNIFICANT CHANGE UP
SODIUM SERPL-SCNC: 140 MMOL/L — SIGNIFICANT CHANGE UP (ref 135–145)
WBC # BLD: 5 K/UL — SIGNIFICANT CHANGE UP (ref 3.8–10.5)
WBC # FLD AUTO: 5 K/UL — SIGNIFICANT CHANGE UP (ref 3.8–10.5)

## 2017-11-27 PROCEDURE — 93010 ELECTROCARDIOGRAM REPORT: CPT

## 2017-11-27 PROCEDURE — 71010: CPT | Mod: 26

## 2017-11-27 RX ORDER — SODIUM CHLORIDE 9 MG/ML
1000 INJECTION, SOLUTION INTRAVENOUS
Qty: 0 | Refills: 0 | Status: DISCONTINUED | OUTPATIENT
Start: 2017-11-27 | End: 2017-12-05

## 2017-11-27 RX ORDER — DIPHENHYDRAMINE HCL 50 MG
25 CAPSULE ORAL ONCE
Qty: 0 | Refills: 0 | Status: COMPLETED | OUTPATIENT
Start: 2017-11-27 | End: 2017-11-27

## 2017-11-27 RX ADMIN — HYDROMORPHONE HYDROCHLORIDE 1 MILLIGRAM(S): 2 INJECTION INTRAMUSCULAR; INTRAVENOUS; SUBCUTANEOUS at 01:46

## 2017-11-27 RX ADMIN — HYDROMORPHONE HYDROCHLORIDE 1 MILLIGRAM(S): 2 INJECTION INTRAMUSCULAR; INTRAVENOUS; SUBCUTANEOUS at 18:35

## 2017-11-27 RX ADMIN — ONDANSETRON 4 MILLIGRAM(S): 8 TABLET, FILM COATED ORAL at 10:19

## 2017-11-27 RX ADMIN — SODIUM CHLORIDE 100 MILLILITER(S): 9 INJECTION, SOLUTION INTRAVENOUS at 18:21

## 2017-11-27 RX ADMIN — HEPARIN SODIUM 5000 UNIT(S): 5000 INJECTION INTRAVENOUS; SUBCUTANEOUS at 13:24

## 2017-11-27 RX ADMIN — HYDROMORPHONE HYDROCHLORIDE 1 MILLIGRAM(S): 2 INJECTION INTRAMUSCULAR; INTRAVENOUS; SUBCUTANEOUS at 10:18

## 2017-11-27 RX ADMIN — HYDROMORPHONE HYDROCHLORIDE 1 MILLIGRAM(S): 2 INJECTION INTRAMUSCULAR; INTRAVENOUS; SUBCUTANEOUS at 14:19

## 2017-11-27 RX ADMIN — HEPARIN SODIUM 5000 UNIT(S): 5000 INJECTION INTRAVENOUS; SUBCUTANEOUS at 21:16

## 2017-11-27 RX ADMIN — HYDROMORPHONE HYDROCHLORIDE 1 MILLIGRAM(S): 2 INJECTION INTRAMUSCULAR; INTRAVENOUS; SUBCUTANEOUS at 14:30

## 2017-11-27 RX ADMIN — HYDROMORPHONE HYDROCHLORIDE 1 MILLIGRAM(S): 2 INJECTION INTRAMUSCULAR; INTRAVENOUS; SUBCUTANEOUS at 06:32

## 2017-11-27 RX ADMIN — HYDROMORPHONE HYDROCHLORIDE 1 MILLIGRAM(S): 2 INJECTION INTRAMUSCULAR; INTRAVENOUS; SUBCUTANEOUS at 23:16

## 2017-11-27 RX ADMIN — HYDROMORPHONE HYDROCHLORIDE 1 MILLIGRAM(S): 2 INJECTION INTRAMUSCULAR; INTRAVENOUS; SUBCUTANEOUS at 06:18

## 2017-11-27 RX ADMIN — HEPARIN SODIUM 5000 UNIT(S): 5000 INJECTION INTRAVENOUS; SUBCUTANEOUS at 06:00

## 2017-11-27 RX ADMIN — Medication 25 MILLIGRAM(S): at 00:54

## 2017-11-27 RX ADMIN — HYDROMORPHONE HYDROCHLORIDE 1 MILLIGRAM(S): 2 INJECTION INTRAMUSCULAR; INTRAVENOUS; SUBCUTANEOUS at 18:22

## 2017-11-27 RX ADMIN — SODIUM CHLORIDE 100 MILLILITER(S): 9 INJECTION, SOLUTION INTRAVENOUS at 05:28

## 2017-11-27 RX ADMIN — HYDROMORPHONE HYDROCHLORIDE 1 MILLIGRAM(S): 2 INJECTION INTRAMUSCULAR; INTRAVENOUS; SUBCUTANEOUS at 01:31

## 2017-11-27 RX ADMIN — Medication 25 MILLIGRAM(S): at 23:55

## 2017-11-27 RX ADMIN — SODIUM CHLORIDE 100 MILLILITER(S): 9 INJECTION, SOLUTION INTRAVENOUS at 22:19

## 2017-11-27 RX ADMIN — HYDROMORPHONE HYDROCHLORIDE 1 MILLIGRAM(S): 2 INJECTION INTRAMUSCULAR; INTRAVENOUS; SUBCUTANEOUS at 22:22

## 2017-11-27 RX ADMIN — HYDROMORPHONE HYDROCHLORIDE 1 MILLIGRAM(S): 2 INJECTION INTRAMUSCULAR; INTRAVENOUS; SUBCUTANEOUS at 10:30

## 2017-11-27 NOTE — PROGRESS NOTE ADULT - SUBJECTIVE AND OBJECTIVE BOX
ON: HERBERT.   11/26: Pathology reports from [place] obtained. Imaging brought to be uploaded. ON: HERBERT.   11/26: Pathology reports from [place] obtained. Imaging brought to be uploaded.         Vital Signs Last 24 Hrs  T(C): 37.2 (27 Nov 2017 14:47), Max: 37.2 (27 Nov 2017 14:47)  T(F): 99 (27 Nov 2017 14:47), Max: 99 (27 Nov 2017 14:47)  HR: 70 (27 Nov 2017 14:47) (70 - 76)  BP: 121/84 (27 Nov 2017 14:47) (114/75 - 136/89)  BP(mean): --  RR: 16 (27 Nov 2017 14:47) (16 - 17)  SpO2: 100% (27 Nov 2017 14:47) (98% - 100%)    I&O's Summary    26 Nov 2017 07:01  -  27 Nov 2017 07:00  --------------------------------------------------------  IN: 2680 mL / OUT: 450 mL / NET: 2230 mL    27 Nov 2017 07:01  -  27 Nov 2017 15:51  --------------------------------------------------------  IN: 100 mL / OUT: 0 mL / NET: 100 mL        SUBJECTIVE: Pt seen and examined at bedside.     Pain: [x ] YES [ ] NO  Pain (0-10):              Pain Control Adequate: [x ] YES [ ] NO  SOB: [ ]YES [x ] NO  Chest Discomfort: [ ] YES [x ] NO    Nausea: [ ] YES [x ] NO           Vomiting: [ ] YES [x ] NO  Flatus: [ ] YES [x ] NO             Bowel Movement: [ ] YES [x ] NO     Void: [x ]YES [ ]No    Physical Exam:  General Appearance: Appears well, NAD  Neck: Supple  Chest: Equal expansion bilaterally, equal breath sounds  CV: Pulse regular presently  Abdomen: Soft, nontense, appropriate incisional tenderness, dressings clean and dry and intact  Extremities: Grossly symmetric, SCD's in place     LABS:                        8.8    5.0   )-----------( 254      ( 27 Nov 2017 06:59 )             28.3     11-27    140  |  103  |  4<L>  ----------------------------<  91  4.0   |  23  |  0.54    Ca    8.7      27 Nov 2017 06:59  Phos  3.4     11-27  Mg     2.2     11-27    TPro  x   /  Alb  3.4  /  TBili  x   /  DBili  x   /  AST  x   /  ALT  x   /  AlkPhos  x   11-26    PT/INR - ( 27 Nov 2017 10:50 )   PT: 11.9 sec;   INR: 1.07          PTT - ( 27 Nov 2017 10:50 )  PTT:37.4 sec      RADIOLOGY & ADDITIONAL STUDIES:

## 2017-11-27 NOTE — PROGRESS NOTE ADULT - ASSESSMENT
46 yo F with hx left hemicolectomy presents with LUQ pain and is found to have narrowing at the level of the sigmoid.     1. Sigmoid stricture possible 2/2 Crohn's Disease  - 11/25: CT - adynamic ileus  - f/u outside hospital medical records    2. Hx of SBO  - currently passing flatus and BM.    3. Hx of left hemicolectomy  - adhesions from surgery likely cause of recurrent SBOs    4. Dispo: home

## 2017-11-27 NOTE — CONSULT NOTE ADULT - ASSESSMENT
46 YO F with long standing history of obstructive symptoms p/w LUQ, nausea, and vomiting and was admitted to surgery for SBO. GI consulted for possible Hirschsprung's disease seen on OSH pathology.     # Abdominal pain, obstruction, r/o Hirschsprung's disease  - Agree with upper GI series with small bowel follow through  - Hirschsprung's disease would not explain her signs of SBO as the colon is primarily affected  - Will consider anorectal manometry  - Need to obtain outpatient records from [facility] regarding previous colectomy  - NPO    Case d/w Dr. Tamayo  GI will follow

## 2017-11-27 NOTE — CONSULT NOTE ADULT - SUBJECTIVE AND OBJECTIVE BOX
HPI:  46 YO F with long standing history of obstructive symptoms p/w LUQ, nausea, and vomiting and was admitted to surgery for SBO. Pt states that she was first diagnosed with obstruction when she was 13 years old and had a colostomy for 6 months in [place] which was then reversed. She was doing well for 1 year when symptoms recurred and she was found to have "twisted intestines" which was surgically corrected. In 1988, her symptoms started again and she states that she had a part of her colon removed. On 5/30/2013 at [facility], she had a rectal biopsy that showed focal ganglion cells; calretinin and NSE stains were focally positive significant for few ganglion cells. She then had a subtotal R hemicolectomy in 9/2013 with pathology that was significant for dilated R colon, unremarkable mucosa, submucosa, and muscularis propria, ganglion cells were representative on all sections. Pt states that her nausea and vomiting have improved and she is passing gas starting today. Last BM was Friday which was liquid, denies fever, chills, CP, SOB, melena, or hematochezia.     Allergies    No Known Allergies    Intolerances    Home medications:  None    MEDICATIONS:  MEDICATIONS  (STANDING):  heparin  Injectable 5000 Unit(s) SubCutaneous every 8 hours  lactated ringers 1000 milliLiter(s) (100 mL/Hr) IV Continuous <Continuous>    MEDICATIONS  (PRN):  HYDROmorphone  Injectable 0.5 milliGRAM(s) IV Push every 4 hours PRN Moderate Pain  HYDROmorphone  Injectable 1 milliGRAM(s) IV Push every 4 hours PRN Severe Pain  ondansetron Injectable 4 milliGRAM(s) IV Push every 6 hours PRN Nausea    PAST MEDICAL & SURGICAL HISTORY:  No significant past medical history  History of colon resection    FAMILY HISTORY:  No significant family history of colon CA, gastric CA, or liver disease.    SOCIAL HISTORY:  [place]: Denies  Alcohol: Denies  Illicit Drugs: Denies    REVIEW OF SYSTEMS: per HPI    Vital Signs Last 24 Hrs  T(C): 37.2 (27 Nov 2017 16:22), Max: 37.2 (27 Nov 2017 14:47)  T(F): 99 (27 Nov 2017 16:22), Max: 99 (27 Nov 2017 14:47)  HR: 71 (27 Nov 2017 16:22) (70 - 76)  BP: 118/77 (27 Nov 2017 16:22) (114/75 - 136/89)  BP(mean): --  RR: 16 (27 Nov 2017 16:22) (16 - 17)  SpO2: 98% (27 Nov 2017 16:22) (98% - 100%)    11-26 @ 07:01  -  11-27 @ 07:00  --------------------------------------------------------  IN: 2680 mL / OUT: 450 mL / NET: 2230 mL    11-27 @ 07:01  -  11-27 @ 18:27  --------------------------------------------------------  IN: 1200 mL / OUT: 0 mL / NET: 1200 mL    PHYSICAL EXAM:    General: Well developed; well nourished; in no acute distress  HEENT: MMM, conjunctiva and sclera clear  Gastrointestinal: Soft, LUQ TTP with guarding non-distended; Midline scar well healed; No rebound  Extremities: Normal range of motion, No clubbing, cyanosis or edema  Neurological: Alert and oriented x3  Skin: Warm and dry. No obvious rash    LABS:                        8.8    5.0   )-----------( 254      ( 27 Nov 2017 06:59 )             28.3     11-27    140  |  103  |  4<L>  ----------------------------<  91  4.0   |  23  |  0.54    Ca    8.7      27 Nov 2017 06:59  Phos  3.4     11-27  Mg     2.2     11-27    TPro  x   /  Alb  3.4  /  TBili  x   /  DBili  x   /  AST  x   /  ALT  x   /  AlkPhos  x   11-26    RADIOLOGY & ADDITIONAL STUDIES:     CT Abdomen and Pelvis w/ IV Cont (11.24.17 @ 19:48)  FINDINGS: Images of the lower chest demonstrate mild bibasilar dependent   atelectasis.    There is a 3 mm hypoattenuating lesion in hepatic segment VII/VIII that   is too small to further characterize. No radiopaque stones are seen   within the distended gallbladder. There is 10 mm dilatation of the common   bile duct. The pancreatic duct in the pancreatic head is prominent,   measuring up to 4 mm. The pancreas is otherwise normal in appearance. No   splenic abnormalities are seen.    The adrenal glands are unremarkable. Subcentimeter hypoattenuating   lesions in both kidneys are too small to further characterize. No   hydronephrosis or nephrolithiasis is present.    No abdominal aortic aneurysm is seen. No lymphadenopathy is seen.    Evaluation of the bowel demonstrates patient to be status post left   hemicolectomy with small bowel-to-colonic anastomosis in the right upper  quadrant. There are multiple fluid and air-filled small bowel loops   predominantly in the right hemiabdomen which measure up to 4 cm in   diameter. There is narrowing of the small bowel at the level of the   anastomosis in the right upper quadrant(series 4 image 22) which is   suggestive of a transition point. There is a 3 cm segment of small bowel   luminal narrowing in the left upper quadrant, subjacent to the left   hemidiaphragm (series 3 images 16-17). Fluid and feces are seen   throughout the rectosigmoid colon distal to the anastomosis. There is no   pneumatosis, pneumoperitoneum, or portal venous gas.    Images of the pelvis demonstrate the uterus to be retroverted and lobular   in contour, likely related to fibroids. No adnexal masses are seen. The   urinary bladder is unremarkable. Mild pelvic ascites is present.    Evaluation of the osseous structures demonstrates mild degenerative   changes.      IMPRESSION:    Status post left hemicolectomy with small bowel to colonic anastomosis.   Multiple dilated gas and fluid-filled small bowel loops predominantly in   the right hemiabdomen are suspicious for small bowel obstruction.   Suggestion of transition point at the level of the anastomosis in the   right upper quadrant. Additional longer segment of small bowel luminal   narrowing in the left upper quadrant which may be related to   underdistention versus underlying pathology.    Dilatation of the common bile duct measuring up to 10 mm in diameter.   Correlation with prior outside imaging is recommended to assess   stability. Alternatively, MRCP may be of use for further evaluation.    CT Abdomen and Pelvis w/ Oral Cont and w/ IV Cont (11.25.17 @ 15:45)  FINDINGS:  Imaged lung bases demonstrate subsegmental bibasilar atelectasis.     The liver, spleen, gallbladder, pancreas, adrenal glands and both kidneys   are unremarkable in appearance. Redemonstrated is mild prominence of the   common bile duct, which tapers to the level of the ampulla.    No abdominal aortic aneurysm. No retroperitoneal mass or lymphadenopathy   is seen.     There is opacification of the remnant colon, with free-flowing retrograde   enteric contrast through the enterocolonic anastomosis in the right   abdomen. There is opacification of multiple dilated loops of distal small   bowel, with associated air-fluid levels throughout the abdomen, measuring   up to 3.9 cm in maximal diameter. There is no abnormal wall thickening,   pneumatosis or periintestinal stranding. No ascites or pneumoperitoneum.  No organized or peripherally enhancing fluid collection in the abdomen or   pelvis.    The urinary bladder and pelvic structures are unchanged in appearance.    Evaluation of the osseous structures demonstrates no aggressive osseous   lesion.       IMPRESSION:  1. Diffuse distention of multiple distal loops of small bowel, with   nonobstructive retrograde passage of enteric contrast through the   enterocolonic anastomotic site. Findings are most suggestive of an   adynamic ileus.    2. No bowelwall thickening, ascites or pneumoperitoneum is seen.  No   organized or peripherally enhancing fluid collection in the abdomen or   pelvis.    Xray Abdomen Series Flat/Upright 2 View (11.26.17 @ 17:20)  FINDINGS: Supine and upright images of the abdomen are submitted for   review. There are enlarged loops of small and large bowel. The upright   image demonstrates multiple differential air-fluid levels, suggestive of   obstruction. Contrast is seen within the rectum. No significant osseous   abnormality.     Evaluation of the lower chest demonstrates linear atelectasis within the   left lower lung zone.    IMPRESSION: Findings suggestive of bowel obstruction.

## 2017-11-28 LAB
ANION GAP SERPL CALC-SCNC: 13 MMOL/L — SIGNIFICANT CHANGE UP (ref 5–17)
BUN SERPL-MCNC: 3 MG/DL — LOW (ref 7–23)
CALCIUM SERPL-MCNC: 8.8 MG/DL — SIGNIFICANT CHANGE UP (ref 8.4–10.5)
CHLORIDE SERPL-SCNC: 103 MMOL/L — SIGNIFICANT CHANGE UP (ref 96–108)
CO2 SERPL-SCNC: 25 MMOL/L — SIGNIFICANT CHANGE UP (ref 22–31)
CREAT SERPL-MCNC: 0.57 MG/DL — SIGNIFICANT CHANGE UP (ref 0.5–1.3)
GLUCOSE SERPL-MCNC: 102 MG/DL — HIGH (ref 70–99)
HCT VFR BLD CALC: 28.6 % — LOW (ref 34.5–45)
HGB BLD-MCNC: 8.9 G/DL — LOW (ref 11.5–15.5)
MAGNESIUM SERPL-MCNC: 2.2 MG/DL — SIGNIFICANT CHANGE UP (ref 1.6–2.6)
MCHC RBC-ENTMCNC: 24.3 PG — LOW (ref 27–34)
MCHC RBC-ENTMCNC: 31.1 G/DL — LOW (ref 32–36)
MCV RBC AUTO: 77.9 FL — LOW (ref 80–100)
PHOSPHATE SERPL-MCNC: 4.2 MG/DL — SIGNIFICANT CHANGE UP (ref 2.5–4.5)
PLATELET # BLD AUTO: 262 K/UL — SIGNIFICANT CHANGE UP (ref 150–400)
POTASSIUM SERPL-MCNC: 3.4 MMOL/L — LOW (ref 3.5–5.3)
POTASSIUM SERPL-SCNC: 3.4 MMOL/L — LOW (ref 3.5–5.3)
RBC # BLD: 3.67 M/UL — LOW (ref 3.8–5.2)
RBC # FLD: 15.8 % — SIGNIFICANT CHANGE UP (ref 10.3–16.9)
SODIUM SERPL-SCNC: 141 MMOL/L — SIGNIFICANT CHANGE UP (ref 135–145)
WBC # BLD: 3.7 K/UL — LOW (ref 3.8–10.5)
WBC # FLD AUTO: 3.7 K/UL — LOW (ref 3.8–10.5)

## 2017-11-28 PROCEDURE — 74245: CPT | Mod: 26

## 2017-11-28 RX ORDER — HYDROCORTISONE 1 %
1 OINTMENT (GRAM) TOPICAL
Qty: 0 | Refills: 0 | Status: DISCONTINUED | OUTPATIENT
Start: 2017-11-28 | End: 2017-12-05

## 2017-11-28 RX ORDER — HYDROMORPHONE HYDROCHLORIDE 2 MG/ML
0.5 INJECTION INTRAMUSCULAR; INTRAVENOUS; SUBCUTANEOUS ONCE
Qty: 0 | Refills: 0 | Status: DISCONTINUED | OUTPATIENT
Start: 2017-11-28 | End: 2017-11-28

## 2017-11-28 RX ORDER — POTASSIUM CHLORIDE 20 MEQ
10 PACKET (EA) ORAL
Qty: 0 | Refills: 0 | Status: COMPLETED | OUTPATIENT
Start: 2017-11-28 | End: 2017-11-28

## 2017-11-28 RX ADMIN — HYDROMORPHONE HYDROCHLORIDE 0.5 MILLIGRAM(S): 2 INJECTION INTRAMUSCULAR; INTRAVENOUS; SUBCUTANEOUS at 18:15

## 2017-11-28 RX ADMIN — HYDROMORPHONE HYDROCHLORIDE 1 MILLIGRAM(S): 2 INJECTION INTRAMUSCULAR; INTRAVENOUS; SUBCUTANEOUS at 04:45

## 2017-11-28 RX ADMIN — HYDROMORPHONE HYDROCHLORIDE 1 MILLIGRAM(S): 2 INJECTION INTRAMUSCULAR; INTRAVENOUS; SUBCUTANEOUS at 21:00

## 2017-11-28 RX ADMIN — HYDROMORPHONE HYDROCHLORIDE 1 MILLIGRAM(S): 2 INJECTION INTRAMUSCULAR; INTRAVENOUS; SUBCUTANEOUS at 08:40

## 2017-11-28 RX ADMIN — HYDROMORPHONE HYDROCHLORIDE 1 MILLIGRAM(S): 2 INJECTION INTRAMUSCULAR; INTRAVENOUS; SUBCUTANEOUS at 08:32

## 2017-11-28 RX ADMIN — HYDROMORPHONE HYDROCHLORIDE 0.5 MILLIGRAM(S): 2 INJECTION INTRAMUSCULAR; INTRAVENOUS; SUBCUTANEOUS at 18:02

## 2017-11-28 RX ADMIN — HYDROMORPHONE HYDROCHLORIDE 1 MILLIGRAM(S): 2 INJECTION INTRAMUSCULAR; INTRAVENOUS; SUBCUTANEOUS at 12:50

## 2017-11-28 RX ADMIN — HEPARIN SODIUM 5000 UNIT(S): 5000 INJECTION INTRAVENOUS; SUBCUTANEOUS at 22:00

## 2017-11-28 RX ADMIN — Medication 100 MILLIEQUIVALENT(S): at 14:25

## 2017-11-28 RX ADMIN — Medication 1 APPLICATION(S): at 17:55

## 2017-11-28 RX ADMIN — HYDROMORPHONE HYDROCHLORIDE 1 MILLIGRAM(S): 2 INJECTION INTRAMUSCULAR; INTRAVENOUS; SUBCUTANEOUS at 04:11

## 2017-11-28 RX ADMIN — HEPARIN SODIUM 5000 UNIT(S): 5000 INJECTION INTRAVENOUS; SUBCUTANEOUS at 14:25

## 2017-11-28 RX ADMIN — HYDROMORPHONE HYDROCHLORIDE 1 MILLIGRAM(S): 2 INJECTION INTRAMUSCULAR; INTRAVENOUS; SUBCUTANEOUS at 12:37

## 2017-11-28 RX ADMIN — HYDROMORPHONE HYDROCHLORIDE 1 MILLIGRAM(S): 2 INJECTION INTRAMUSCULAR; INTRAVENOUS; SUBCUTANEOUS at 16:38

## 2017-11-28 RX ADMIN — SODIUM CHLORIDE 100 MILLILITER(S): 9 INJECTION, SOLUTION INTRAVENOUS at 07:12

## 2017-11-28 RX ADMIN — Medication 1 APPLICATION(S): at 14:26

## 2017-11-28 RX ADMIN — Medication 100 MILLIEQUIVALENT(S): at 12:37

## 2017-11-28 RX ADMIN — Medication 100 MILLIEQUIVALENT(S): at 16:39

## 2017-11-28 RX ADMIN — HYDROMORPHONE HYDROCHLORIDE 1 MILLIGRAM(S): 2 INJECTION INTRAMUSCULAR; INTRAVENOUS; SUBCUTANEOUS at 17:00

## 2017-11-28 RX ADMIN — HYDROMORPHONE HYDROCHLORIDE 1 MILLIGRAM(S): 2 INJECTION INTRAMUSCULAR; INTRAVENOUS; SUBCUTANEOUS at 20:42

## 2017-11-28 RX ADMIN — ONDANSETRON 4 MILLIGRAM(S): 8 TABLET, FILM COATED ORAL at 16:38

## 2017-11-28 RX ADMIN — HEPARIN SODIUM 5000 UNIT(S): 5000 INJECTION INTRAVENOUS; SUBCUTANEOUS at 05:04

## 2017-11-28 NOTE — PROGRESS NOTE ADULT - ASSESSMENT
48 YO F with long standing history of obstructive symptoms p/w LUQ, nausea, and vomiting and was admitted to surgery for SBO. GI consulted for possible Hirschsprung's disease seen on OSH pathology.     # Abdominal pain, obstruction, r/o Hirschsprung's disease  - F/u upper GI series with small bowel follow through  - 46 YO F with long standing history of obstructive symptoms p/w LUQ, nausea, and vomiting and was admitted to surgery for SBO. GI consulted for possible Hirschsprung's disease seen on OSH pathology.     # Abdominal pain, SBO, r/o Hirschsprung's disease  - F/u upper GI series with small bowel follow through  - Plan for anorectal manometry pending endoscopy scheduling    Case d/w Dr. Kamara  GI will follow

## 2017-11-28 NOTE — PROGRESS NOTE ADULT - SUBJECTIVE AND OBJECTIVE BOX
ON: Case discussed with GI  11/27: After discussion with Dr foy, surgery canceled for today. Small bowel follow through ordered. GI consulted for possible dysmotility syndrome      Vital Signs Last 24 Hrs  T(C): 36.3 (28 Nov 2017 05:27), Max: 37.2 (27 Nov 2017 14:47)  T(F): 97.3 (28 Nov 2017 05:27), Max: 99 (27 Nov 2017 14:47)  HR: 66 (28 Nov 2017 05:27) (66 - 74)  BP: 106/69 (28 Nov 2017 05:27) (106/69 - 121/84)  BP(mean): --  RR: 16 (28 Nov 2017 05:27) (16 - 16)  SpO2: 99% (28 Nov 2017 05:27) (98% - 100%)    I&O's Summary    27 Nov 2017 07:01  -  28 Nov 2017 07:00  --------------------------------------------------------  IN: 2200 mL / OUT: 600 mL / NET: 1600 mL        SUBJECTIVE: Pt seen and examined at bedside. Continued pain.     Pain: [x ] YES [ ] NO  Pain (0-10):              Pain Control Adequate: [x ] YES [ ] NO  SOB: [ ]YES [x ] NO  Chest Discomfort: [ ] YES [x ] NO    Nausea: [ ] YES [x ] NO           Vomiting: [ ] YES [x ] NO  Flatus: [ x] YES [ ] NO             Bowel Movement: [ ] YES [x ] NO     Void: [x ]YES [ ]No    Physical Exam:  General Appearance: Appears well, NAD  Neck: Supple  Chest: Equal expansion bilaterally, equal breath sounds  CV: Pulse regular presently  Abdomen: Soft, nontense,non-distended. epigastric tenderness.   Extremities: Grossly symmetric, SCD's in place     LABS:                        8.9    3.7   )-----------( 262      ( 28 Nov 2017 07:08 )             28.6     11-28    141  |  103  |  3<L>  ----------------------------<  102<H>  3.4<L>   |  25  |  0.57    Ca    8.8      28 Nov 2017 07:08  Phos  4.2     11-28  Mg     2.2     11-28      PT/INR - ( 27 Nov 2017 10:50 )   PT: 11.9 sec;   INR: 1.07          PTT - ( 27 Nov 2017 10:50 )  PTT:37.4 sec      RADIOLOGY & ADDITIONAL STUDIES:

## 2017-11-28 NOTE — PROGRESS NOTE ADULT - ASSESSMENT
48 yo F with hx right hemicolectomy presents with LUQ pain and is found to have narrowing at the level of the sigmoid.     1. SBO  - 11/25: CT - adynamic ileus  - GI consult and SB follow through for possible dysmotility will do anorectal mannometry  - Small Bowel follow through    2. Hx of Right hemicolectomy  - adhesions from surgery likely cause of recurrent SBOs    3. Dispo: home

## 2017-11-28 NOTE — PROGRESS NOTE ADULT - SUBJECTIVE AND OBJECTIVE BOX
Pt seen and examined at bedside. HERBERT overnight. Pt reports persistent abdominal pain and nausea. Denies vomiting, melena, hematochezia.     Allergies  No Known Allergies    MEDICATIONS:  MEDICATIONS  (STANDING):  dextrose 5% + sodium chloride 0.45%. 1000 milliLiter(s) (100 mL/Hr) IV Continuous <Continuous>  heparin  Injectable 5000 Unit(s) SubCutaneous every 8 hours  potassium chloride  10 mEq/100 mL IVPB 10 milliEquivalent(s) IV Intermittent every 1 hour    MEDICATIONS  (PRN):  HYDROmorphone  Injectable 0.5 milliGRAM(s) IV Push every 4 hours PRN Moderate Pain  HYDROmorphone  Injectable 1 milliGRAM(s) IV Push every 4 hours PRN Severe Pain  ondansetron Injectable 4 milliGRAM(s) IV Push every 6 hours PRN Nausea    Vital Signs Last 24 Hrs  T(C): 36.3 (28 Nov 2017 05:27), Max: 37.2 (27 Nov 2017 14:47)  T(F): 97.3 (28 Nov 2017 05:27), Max: 99 (27 Nov 2017 14:47)  HR: 66 (28 Nov 2017 05:27) (66 - 74)  BP: 106/69 (28 Nov 2017 05:27) (106/69 - 121/84)  BP(mean): --  RR: 16 (28 Nov 2017 05:27) (16 - 16)  SpO2: 99% (28 Nov 2017 05:27) (98% - 100%)    11-27 @ 07:01  -  11-28 @ 07:00  --------------------------------------------------------  IN: 2200 mL / OUT: 600 mL / NET: 1600 mL    PHYSICAL EXAM:    General: Well developed; well nourished; in no acute distress  HEENT: MMM, conjunctiva and sclera clear  Gastrointestinal: Soft, LUQ TTP with guarding non-distended; Midline scar well healed; No rebound  Extremities: Normal range of motion, No clubbing, cyanosis or edema  Skin: Warm and dry. No obvious rash    LABS:                        8.9    3.7   )-----------( 262      ( 28 Nov 2017 07:08 )             28.6     11-28    141  |  103  |  3<L>  ----------------------------<  102<H>  3.4<L>   |  25  |  0.57    Ca    8.8      28 Nov 2017 07:08  Phos  4.2     11-28  Mg     2.2     11-28      PT/INR - ( 27 Nov 2017 10:50 )   PT: 11.9 sec;   INR: 1.07          PTT - ( 27 Nov 2017 10:50 )  PTT:37.4 sec    RADIOLOGY & ADDITIONAL STUDIES:  Xray Chest 1 View AP- PORTABLE-Urgent (11.27.17 @ 10:37)  FINDINGS: Single frontal view of the chest demonstrates no focal   consolidation. No pneumothorax. No pleural effusions. The cardiac and   mediastinal silhouettes are unremarkable. The visualized osseous   structures are unremarkable.    Impression: Clear lungs.

## 2017-11-29 LAB
ANION GAP SERPL CALC-SCNC: 12 MMOL/L — SIGNIFICANT CHANGE UP (ref 5–17)
BUN SERPL-MCNC: 3 MG/DL — LOW (ref 7–23)
CALCIUM SERPL-MCNC: 8.8 MG/DL — SIGNIFICANT CHANGE UP (ref 8.4–10.5)
CHLORIDE SERPL-SCNC: 103 MMOL/L — SIGNIFICANT CHANGE UP (ref 96–108)
CO2 SERPL-SCNC: 26 MMOL/L — SIGNIFICANT CHANGE UP (ref 22–31)
CREAT SERPL-MCNC: 0.6 MG/DL — SIGNIFICANT CHANGE UP (ref 0.5–1.3)
GLUCOSE SERPL-MCNC: 104 MG/DL — HIGH (ref 70–99)
HCT VFR BLD CALC: 29.6 % — LOW (ref 34.5–45)
HGB BLD-MCNC: 9.3 G/DL — LOW (ref 11.5–15.5)
MAGNESIUM SERPL-MCNC: 2.3 MG/DL — SIGNIFICANT CHANGE UP (ref 1.6–2.6)
MCHC RBC-ENTMCNC: 24.3 PG — LOW (ref 27–34)
MCHC RBC-ENTMCNC: 31.4 G/DL — LOW (ref 32–36)
MCV RBC AUTO: 77.3 FL — LOW (ref 80–100)
PHOSPHATE SERPL-MCNC: 4.5 MG/DL — SIGNIFICANT CHANGE UP (ref 2.5–4.5)
PLATELET # BLD AUTO: 265 K/UL — SIGNIFICANT CHANGE UP (ref 150–400)
POTASSIUM SERPL-MCNC: 3.6 MMOL/L — SIGNIFICANT CHANGE UP (ref 3.5–5.3)
POTASSIUM SERPL-SCNC: 3.6 MMOL/L — SIGNIFICANT CHANGE UP (ref 3.5–5.3)
RBC # BLD: 3.83 M/UL — SIGNIFICANT CHANGE UP (ref 3.8–5.2)
RBC # FLD: 16.1 % — SIGNIFICANT CHANGE UP (ref 10.3–16.9)
SODIUM SERPL-SCNC: 141 MMOL/L — SIGNIFICANT CHANGE UP (ref 135–145)
WBC # BLD: 4 K/UL — SIGNIFICANT CHANGE UP (ref 3.8–10.5)
WBC # FLD AUTO: 4 K/UL — SIGNIFICANT CHANGE UP (ref 3.8–10.5)

## 2017-11-29 RX ORDER — DIPHENHYDRAMINE HCL 50 MG
25 CAPSULE ORAL EVERY 12 HOURS
Qty: 0 | Refills: 0 | Status: DISCONTINUED | OUTPATIENT
Start: 2017-11-29 | End: 2017-12-05

## 2017-11-29 RX ORDER — ACETAMINOPHEN 500 MG
750 TABLET ORAL ONCE
Qty: 0 | Refills: 0 | Status: COMPLETED | OUTPATIENT
Start: 2017-11-29 | End: 2017-11-29

## 2017-11-29 RX ORDER — LINACLOTIDE 145 UG/1
290 CAPSULE, GELATIN COATED ORAL
Qty: 0 | Refills: 0 | Status: DISCONTINUED | OUTPATIENT
Start: 2017-11-29 | End: 2017-12-01

## 2017-11-29 RX ORDER — POTASSIUM CHLORIDE 20 MEQ
10 PACKET (EA) ORAL
Qty: 0 | Refills: 0 | Status: COMPLETED | OUTPATIENT
Start: 2017-11-29 | End: 2017-11-29

## 2017-11-29 RX ADMIN — Medication 101 MILLIGRAM(S): at 23:10

## 2017-11-29 RX ADMIN — HYDROMORPHONE HYDROCHLORIDE 1 MILLIGRAM(S): 2 INJECTION INTRAMUSCULAR; INTRAVENOUS; SUBCUTANEOUS at 06:30

## 2017-11-29 RX ADMIN — HYDROMORPHONE HYDROCHLORIDE 1 MILLIGRAM(S): 2 INJECTION INTRAMUSCULAR; INTRAVENOUS; SUBCUTANEOUS at 14:35

## 2017-11-29 RX ADMIN — Medication 100 MILLIEQUIVALENT(S): at 18:24

## 2017-11-29 RX ADMIN — Medication 101 MILLIGRAM(S): at 02:00

## 2017-11-29 RX ADMIN — HYDROMORPHONE HYDROCHLORIDE 1 MILLIGRAM(S): 2 INJECTION INTRAMUSCULAR; INTRAVENOUS; SUBCUTANEOUS at 01:31

## 2017-11-29 RX ADMIN — Medication 300 MILLIGRAM(S): at 02:13

## 2017-11-29 RX ADMIN — HYDROMORPHONE HYDROCHLORIDE 1 MILLIGRAM(S): 2 INJECTION INTRAMUSCULAR; INTRAVENOUS; SUBCUTANEOUS at 18:35

## 2017-11-29 RX ADMIN — HYDROMORPHONE HYDROCHLORIDE 1 MILLIGRAM(S): 2 INJECTION INTRAMUSCULAR; INTRAVENOUS; SUBCUTANEOUS at 22:38

## 2017-11-29 RX ADMIN — HYDROMORPHONE HYDROCHLORIDE 1 MILLIGRAM(S): 2 INJECTION INTRAMUSCULAR; INTRAVENOUS; SUBCUTANEOUS at 18:25

## 2017-11-29 RX ADMIN — Medication 100 MILLIEQUIVALENT(S): at 14:22

## 2017-11-29 RX ADMIN — HYDROMORPHONE HYDROCHLORIDE 1 MILLIGRAM(S): 2 INJECTION INTRAMUSCULAR; INTRAVENOUS; SUBCUTANEOUS at 01:16

## 2017-11-29 RX ADMIN — HYDROMORPHONE HYDROCHLORIDE 1 MILLIGRAM(S): 2 INJECTION INTRAMUSCULAR; INTRAVENOUS; SUBCUTANEOUS at 06:15

## 2017-11-29 RX ADMIN — Medication 100 MILLIEQUIVALENT(S): at 16:09

## 2017-11-29 RX ADMIN — HYDROMORPHONE HYDROCHLORIDE 1 MILLIGRAM(S): 2 INJECTION INTRAMUSCULAR; INTRAVENOUS; SUBCUTANEOUS at 23:00

## 2017-11-29 RX ADMIN — HEPARIN SODIUM 5000 UNIT(S): 5000 INJECTION INTRAVENOUS; SUBCUTANEOUS at 06:15

## 2017-11-29 RX ADMIN — Medication 750 MILLIGRAM(S): at 03:00

## 2017-11-29 RX ADMIN — HYDROMORPHONE HYDROCHLORIDE 1 MILLIGRAM(S): 2 INJECTION INTRAMUSCULAR; INTRAVENOUS; SUBCUTANEOUS at 10:35

## 2017-11-29 RX ADMIN — HEPARIN SODIUM 5000 UNIT(S): 5000 INJECTION INTRAVENOUS; SUBCUTANEOUS at 22:07

## 2017-11-29 RX ADMIN — HYDROMORPHONE HYDROCHLORIDE 1 MILLIGRAM(S): 2 INJECTION INTRAMUSCULAR; INTRAVENOUS; SUBCUTANEOUS at 10:20

## 2017-11-29 RX ADMIN — HEPARIN SODIUM 5000 UNIT(S): 5000 INJECTION INTRAVENOUS; SUBCUTANEOUS at 14:28

## 2017-11-29 RX ADMIN — HYDROMORPHONE HYDROCHLORIDE 1 MILLIGRAM(S): 2 INJECTION INTRAMUSCULAR; INTRAVENOUS; SUBCUTANEOUS at 14:22

## 2017-11-29 RX ADMIN — Medication 2 ENEMA: at 09:51

## 2017-11-29 NOTE — PROGRESS NOTE ADULT - SUBJECTIVE AND OBJECTIVE BOX
ON: awaiting read . HERBERT  11/28: Small bowel follow though completed, awaiting read. manometry to be done tomorrow ON: awaiting read . HERBERT  11/28: Small bowel follow though completed, awaiting read. manometry to be done tomorrow      Vital Signs Last 24 Hrs  T(C): 36.6 (29 Nov 2017 08:30), Max: 37.1 (28 Nov 2017 13:34)  T(F): 97.9 (29 Nov 2017 08:30), Max: 98.7 (28 Nov 2017 13:34)  HR: 67 (29 Nov 2017 08:30) (67 - 86)  BP: 96/62 (29 Nov 2017 08:30) (96/62 - 129/82)  BP(mean): --  RR: 16 (29 Nov 2017 08:30) (16 - 18)  SpO2: 97% (29 Nov 2017 08:30) (97% - 100%)    I&O's Summary    28 Nov 2017 07:01  -  29 Nov 2017 07:00  --------------------------------------------------------  IN: 2025 mL / OUT: 200 mL / NET: 1825 mL    29 Nov 2017 07:01  -  29 Nov 2017 12:46  --------------------------------------------------------  IN: 0 mL / OUT: 650 mL / NET: -650 mL        SUBJECTIVE: Pt seen and examined at bedside. feeling somewhat less pain.     Pain: [x ] YES [ ] NO  Pain (0-10):              Pain Control Adequate: [x ] YES [ ] NO  SOB: [ ]YES [x ] NO  Chest Discomfort: [ ] YES [x ] NO    Nausea: [ ] YES [ x] NO           Vomiting: [ ] YES [ x] NO  Flatus: [ ] YES [x ] NO             Bowel Movement: [ ] YES [x ] NO     Void: [x ]YES [ ]No    Physical Exam:  General Appearance: Appears well, NAD  Neck: Supple  Chest: Equal expansion bilaterally, equal breath sounds  CV: Pulse regular presently  Abdomen: Soft, nontense, appropriate incisional tenderness,  Extremities: Grossly symmetric, SCD's in place     LABS:                        9.3    4.0   )-----------( 265      ( 29 Nov 2017 06:59 )             29.6     11-29    141  |  103  |  3<L>  ----------------------------<  104<H>  3.6   |  26  |  0.60    Ca    8.8      29 Nov 2017 06:59  Phos  4.5     11-29  Mg     2.3     11-29            RADIOLOGY & ADDITIONAL STUDIES:

## 2017-11-29 NOTE — DIETITIAN INITIAL EVALUATION ADULT. - ETIOLOGY
RT predicted hypercaloric intake prior to admission and unsatisfactory results with previous wt loss attempts RT diet order NPO x6days

## 2017-11-29 NOTE — DIETITIAN INITIAL EVALUATION ADULT. - NUTRITION INTERVENTION
Nutrition Education Nutrition Education/Meals and Snack/Parenteral Nutrition/IV Fluids Meals and Snack/Parenteral Nutrition/IV Fluids

## 2017-11-29 NOTE — DIETITIAN INITIAL EVALUATION ADULT. - NUTRITIONGOAL OUTCOME1
Pt to recall 2 main concepts from education (protein needs, fluid needs, vitamin and mineral needs) Pt to meet >75% estimated needs via appropriate route

## 2017-11-29 NOTE — DIETITIAN INITIAL EVALUATION ADULT. - OTHER INFO
48yo F s/p lap sleeve gastrectomy POD1. Currently on BARICLLIQ and tolerating well. States she is having sips of water. Denies GI distress. Pain controlled. Prepared w/ adequate supplements. RD provided written and oral edu on diet advancement process and specific nutrient needs s/p LSG. Pt reports wt prior to Sx is 293lbs. NKFA or dietary restrictions. Skin: surgical incision; GI WDL per flowsheet. Pt admitted w/ h/o of R hemicolectomy p/w LUQ pain. Found to have narrowing at level of sigmoid. 11/25 CT showed adynamic ileus, 11/28 small bowel follow through performed. Plan for anorectal manometry today. Suspect adhesions from R qian likely caused recurrent SBOs. Pt NPO since admission 11/24. Denies GI distress at present except nausea after drinking contrast. Reports zofran resolved nausea. LUQ pain is a 6/10. NKFA or dietary restrictions. Skin and GI WDL per flowsheet. If PO intake cannot be initiated w/in next 24 hrs recommend alternative form of nutrition as pt is at risk for mild to moderate malnutrition. See recommendations below.

## 2017-11-29 NOTE — DIETITIAN INITIAL EVALUATION ADULT. - NS AS NUTRI INTERV ED CONTENT
Educated on bariatric diet progression, importance of adequate protein, hydration, and compliance with vitamin supplements.  Pt expressed positive, verbal understanding; expected compliance is good./Purpose of the nutrition education Purpose of the nutrition education TPN: 240g Dex, 60g AA, 50g lipids 3-4x/week (1556kcal, 1.2g pro/kg, GIR 3.3). Initiate w/in 24 hrs. Monitor triglycerides and adjust lipids per MD discretion./Purpose of the nutrition education

## 2017-11-29 NOTE — DIETITIAN INITIAL EVALUATION ADULT. - ENERGY NEEDS
Height: 5'2" Weight: 293lbs, IBW 110lbs+/-10%, %%, BMI 53.5  IBW used for calculations as pt >120% of IBW   Above energy needs calculated for wt maintenance (20-25kcal/kg).   Weeks 1-2 estimated needs: 498-598kcal/day (10-12kcal/kg), 75-105g pro/day (1.5-2.1g/kg), >/=64oz clear fluids. Height: 5'2" Weight: 110lbs, IBW 110lbs+/-10%, %%, BMI 20.1  ABW used for calculations as pt between % of IBW.  Needs adjusted 2/2 per labs and not meeting needs for extended period of time

## 2017-11-29 NOTE — PROGRESS NOTE ADULT - ASSESSMENT
46 yo F with hx right hemicolectomy presents with LUQ pain and is found to have narrowing at the level of the sigmoid.     1. SBO  - 11/25: CT - adynamic ileus  - 11/28: small bowel follow through performed  - GI consult     2. Hx of Right hemicolectomy  - adhesions from surgery likely cause of recurrent SBOs    3. Dispo: home 46 yo F with hx right hemicolectomy presents with LUQ pain and is found to have narrowing at the level of the sigmoid.     1. SBO  - 11/25: CT - adynamic ileus  - 11/28: small bowel follow through performed  - GI consult - anorectal manometry today    2. Hx of Right hemicolectomy  - adhesions from surgery likely cause of recurrent SBOs    3. Dispo: home

## 2017-11-29 NOTE — DIETITIAN INITIAL EVALUATION ADULT. - NS AS NUTRI INTERV PARENTERAL
If PO not feasible, recommend TPN If PO not feasible, recommend TPN: 240g Dex, 60g AA, 50g lipids 3-4x/week (1556kcal, 1.2g pro/kg, GIR 3.3). Initiate w/in 24 hrs. Monitor triglycerides and adjust lipids per MD discretion.

## 2017-11-30 LAB
ANION GAP SERPL CALC-SCNC: 14 MMOL/L — SIGNIFICANT CHANGE UP (ref 5–17)
BUN SERPL-MCNC: 4 MG/DL — LOW (ref 7–23)
CALCIUM SERPL-MCNC: 8.7 MG/DL — SIGNIFICANT CHANGE UP (ref 8.4–10.5)
CHLORIDE SERPL-SCNC: 102 MMOL/L — SIGNIFICANT CHANGE UP (ref 96–108)
CO2 SERPL-SCNC: 24 MMOL/L — SIGNIFICANT CHANGE UP (ref 22–31)
CREAT SERPL-MCNC: 0.57 MG/DL — SIGNIFICANT CHANGE UP (ref 0.5–1.3)
GLUCOSE SERPL-MCNC: 93 MG/DL — SIGNIFICANT CHANGE UP (ref 70–99)
HCT VFR BLD CALC: 30.4 % — LOW (ref 34.5–45)
HGB BLD-MCNC: 9.4 G/DL — LOW (ref 11.5–15.5)
MAGNESIUM SERPL-MCNC: 2.3 MG/DL — SIGNIFICANT CHANGE UP (ref 1.6–2.6)
MCHC RBC-ENTMCNC: 24.1 PG — LOW (ref 27–34)
MCHC RBC-ENTMCNC: 30.9 G/DL — LOW (ref 32–36)
MCV RBC AUTO: 77.9 FL — LOW (ref 80–100)
PHOSPHATE SERPL-MCNC: 4.3 MG/DL — SIGNIFICANT CHANGE UP (ref 2.5–4.5)
PLATELET # BLD AUTO: 267 K/UL — SIGNIFICANT CHANGE UP (ref 150–400)
POTASSIUM SERPL-MCNC: 3.5 MMOL/L — SIGNIFICANT CHANGE UP (ref 3.5–5.3)
POTASSIUM SERPL-SCNC: 3.5 MMOL/L — SIGNIFICANT CHANGE UP (ref 3.5–5.3)
RBC # BLD: 3.9 M/UL — SIGNIFICANT CHANGE UP (ref 3.8–5.2)
RBC # FLD: 15.9 % — SIGNIFICANT CHANGE UP (ref 10.3–16.9)
SODIUM SERPL-SCNC: 140 MMOL/L — SIGNIFICANT CHANGE UP (ref 135–145)
WBC # BLD: 4.2 K/UL — SIGNIFICANT CHANGE UP (ref 3.8–10.5)
WBC # FLD AUTO: 4.2 K/UL — SIGNIFICANT CHANGE UP (ref 3.8–10.5)

## 2017-11-30 PROCEDURE — 91120: CPT | Mod: 26

## 2017-11-30 PROCEDURE — 91122 ANORECTAL MANOMETRY: CPT | Mod: 26

## 2017-11-30 RX ORDER — POTASSIUM CHLORIDE 20 MEQ
40 PACKET (EA) ORAL ONCE
Qty: 0 | Refills: 0 | Status: COMPLETED | OUTPATIENT
Start: 2017-11-30 | End: 2017-11-30

## 2017-11-30 RX ADMIN — HYDROMORPHONE HYDROCHLORIDE 1 MILLIGRAM(S): 2 INJECTION INTRAMUSCULAR; INTRAVENOUS; SUBCUTANEOUS at 06:55

## 2017-11-30 RX ADMIN — ONDANSETRON 4 MILLIGRAM(S): 8 TABLET, FILM COATED ORAL at 14:44

## 2017-11-30 RX ADMIN — Medication 40 MILLIEQUIVALENT(S): at 13:06

## 2017-11-30 RX ADMIN — HYDROMORPHONE HYDROCHLORIDE 1 MILLIGRAM(S): 2 INJECTION INTRAMUSCULAR; INTRAVENOUS; SUBCUTANEOUS at 14:47

## 2017-11-30 RX ADMIN — HYDROMORPHONE HYDROCHLORIDE 0.5 MILLIGRAM(S): 2 INJECTION INTRAMUSCULAR; INTRAVENOUS; SUBCUTANEOUS at 10:47

## 2017-11-30 RX ADMIN — HYDROMORPHONE HYDROCHLORIDE 1 MILLIGRAM(S): 2 INJECTION INTRAMUSCULAR; INTRAVENOUS; SUBCUTANEOUS at 23:20

## 2017-11-30 RX ADMIN — HEPARIN SODIUM 5000 UNIT(S): 5000 INJECTION INTRAVENOUS; SUBCUTANEOUS at 06:40

## 2017-11-30 RX ADMIN — HYDROMORPHONE HYDROCHLORIDE 1 MILLIGRAM(S): 2 INJECTION INTRAMUSCULAR; INTRAVENOUS; SUBCUTANEOUS at 19:10

## 2017-11-30 RX ADMIN — HYDROMORPHONE HYDROCHLORIDE 1 MILLIGRAM(S): 2 INJECTION INTRAMUSCULAR; INTRAVENOUS; SUBCUTANEOUS at 23:02

## 2017-11-30 RX ADMIN — HEPARIN SODIUM 5000 UNIT(S): 5000 INJECTION INTRAVENOUS; SUBCUTANEOUS at 14:31

## 2017-11-30 RX ADMIN — ONDANSETRON 4 MILLIGRAM(S): 8 TABLET, FILM COATED ORAL at 02:54

## 2017-11-30 RX ADMIN — HYDROMORPHONE HYDROCHLORIDE 1 MILLIGRAM(S): 2 INJECTION INTRAMUSCULAR; INTRAVENOUS; SUBCUTANEOUS at 03:14

## 2017-11-30 RX ADMIN — HYDROMORPHONE HYDROCHLORIDE 1 MILLIGRAM(S): 2 INJECTION INTRAMUSCULAR; INTRAVENOUS; SUBCUTANEOUS at 02:54

## 2017-11-30 RX ADMIN — HEPARIN SODIUM 5000 UNIT(S): 5000 INJECTION INTRAVENOUS; SUBCUTANEOUS at 22:10

## 2017-11-30 RX ADMIN — HYDROMORPHONE HYDROCHLORIDE 1 MILLIGRAM(S): 2 INJECTION INTRAMUSCULAR; INTRAVENOUS; SUBCUTANEOUS at 07:15

## 2017-11-30 RX ADMIN — Medication 101 MILLIGRAM(S): at 14:44

## 2017-11-30 RX ADMIN — HYDROMORPHONE HYDROCHLORIDE 0.5 MILLIGRAM(S): 2 INJECTION INTRAMUSCULAR; INTRAVENOUS; SUBCUTANEOUS at 10:09

## 2017-11-30 RX ADMIN — SODIUM CHLORIDE 100 MILLILITER(S): 9 INJECTION, SOLUTION INTRAVENOUS at 18:19

## 2017-11-30 RX ADMIN — ONDANSETRON 4 MILLIGRAM(S): 8 TABLET, FILM COATED ORAL at 22:12

## 2017-11-30 RX ADMIN — LINACLOTIDE 290 MICROGRAM(S): 145 CAPSULE, GELATIN COATED ORAL at 06:42

## 2017-11-30 RX ADMIN — HYDROMORPHONE HYDROCHLORIDE 1 MILLIGRAM(S): 2 INJECTION INTRAMUSCULAR; INTRAVENOUS; SUBCUTANEOUS at 14:32

## 2017-11-30 RX ADMIN — HYDROMORPHONE HYDROCHLORIDE 1 MILLIGRAM(S): 2 INJECTION INTRAMUSCULAR; INTRAVENOUS; SUBCUTANEOUS at 18:55

## 2017-11-30 NOTE — PROGRESS NOTE ADULT - SUBJECTIVE AND OBJECTIVE BOX
Pt seen and examined at bedside. HERBERT overnight. Pt states that she attempted clear liquids in AM with subsequent abdominal pain. Denies nausea or vomiting. Pt has not had a BM, passed flatus last night.    Allergies    No Known Allergies    MEDICATIONS:  MEDICATIONS  (STANDING):  dextrose 5% + sodium chloride 0.45%. 1000 milliLiter(s) (100 mL/Hr) IV Continuous <Continuous>  heparin  Injectable 5000 Unit(s) SubCutaneous every 8 hours  linaclotide 290 MICROGram(s) Oral before breakfast    MEDICATIONS  (PRN):  diphenhydrAMINE  IVPB 25 milliGRAM(s) IV Intermittent every 12 hours PRN Itching  hydrocortisone 0.5% Cream 1 Application(s) Topical four times a day PRN Rash and/or Itching  HYDROmorphone  Injectable 0.5 milliGRAM(s) IV Push every 4 hours PRN Moderate Pain  HYDROmorphone  Injectable 1 milliGRAM(s) IV Push every 4 hours PRN Severe Pain  ondansetron Injectable 4 milliGRAM(s) IV Push every 6 hours PRN Nausea    Vital Signs Last 24 Hrs  T(C): 37.2 (30 Nov 2017 16:56), Max: 37.2 (30 Nov 2017 12:45)  T(F): 98.9 (30 Nov 2017 16:56), Max: 99 (30 Nov 2017 12:45)  HR: 72 (30 Nov 2017 16:56) (72 - 81)  BP: 104/67 (30 Nov 2017 16:56) (94/62 - 126/82)  BP(mean): --  RR: 17 (30 Nov 2017 16:56) (16 - 17)  SpO2: 99% (30 Nov 2017 16:56) (99% - 100%)    11-29 @ 07:01 - 11-30 @ 07:00  --------------------------------------------------------  IN: 1200 mL / OUT: 1450 mL / NET: -250 mL    11-30 @ 07:01 - 11-30 @ 18:56  --------------------------------------------------------  IN: 240 mL / OUT: 0 mL / NET: 240 mL    PHYSICAL EXAM:    General: Well developed; well nourished; in no acute distress  HEENT: MMM, conjunctiva and sclera clear  Gastrointestinal: Soft, LUQ TTP with guarding non-distended; Midline scar well healed; No rebound  Extremities: Normal range of motion, No clubbing, cyanosis or edema    LABS:                        9.4    4.2   )-----------( 267      ( 30 Nov 2017 06:34 )             30.4     11-30    140  |  102  |  4<L>  ----------------------------<  93  3.5   |  24  |  0.57    Ca    8.7      30 Nov 2017 06:34  Phos  4.3     11-30  Mg     2.3     11-30    RADIOLOGY & ADDITIONAL STUDIES:  Xray Upper GI + Small Bowel Series (11.28.17 @ 12:20)  FINDINGS:  Supine and upright images of the abdomen are obtained during oral   contrast administration with serial follow-up images total contrast   reached the ileocecal valve. The distal esophagus is normal in course and   caliber. Esophageal motility is unremarkable. No hiatal hernia seen.   There is no gastroesophageal reflux. There is no evidence of gastric mass   or ulcer. The duodenal bulb is without ulceration or deformity. There is   a tiny diverticulum of the third portion of the duodenum. The duodenal   sweep and proximal jejunum are otherwise unremarkable.    There is delayed transit of enteric contrast in the small bowel,   requiring approximately 6 hours for contrast to reach the ileocecal valve   (normal transit time is considered to be less than 3 hours). Some of   small bowel loops are mildly dilated but there is no evidence of small   bowel obstruction.       IMPRESSION: Markedly delayed passage of contrast through the small bowel   without bowel obstruction.

## 2017-11-30 NOTE — PROGRESS NOTE ADULT - SUBJECTIVE AND OBJECTIVE BOX
ON: HERBERT. anorectal manometry 11/30 11/29: Small bowel follow throuh showed >6hr transit time. (normal 3hr) Linzess added. ON: HERBERT. anorectal manometry 11/30 11/29: Small bowel follow throuh showed >6hr transit time. (normal 3hr) Linzess added.    STATUS POST:      POST OPERATIVE DAY #:     Vital Signs Last 24 Hrs  T(C): 37.2 (30 Nov 2017 16:56), Max: 37.2 (30 Nov 2017 12:45)  T(F): 98.9 (30 Nov 2017 16:56), Max: 99 (30 Nov 2017 12:45)  HR: 72 (30 Nov 2017 16:56) (72 - 81)  BP: 104/67 (30 Nov 2017 16:56) (94/62 - 126/82)  BP(mean): --  RR: 17 (30 Nov 2017 16:56) (16 - 17)  SpO2: 99% (30 Nov 2017 16:56) (99% - 100%)    I&O's Summary    29 Nov 2017 07:01  -  30 Nov 2017 07:00  --------------------------------------------------------  IN: 1200 mL / OUT: 1450 mL / NET: -250 mL    30 Nov 2017 07:01  -  30 Nov 2017 20:08  --------------------------------------------------------  IN: 1440 mL / OUT: 0 mL / NET: 1440 mL        SUBJECTIVE: Pt seen and examined at bedside.     Pain: [ ] YES [ x] NO  Pain (0-10):              Pain Control Adequate: [x ] YES [ ] NO  SOB: [ ]YES [x ] NO  Chest Discomfort: [ ] YES [ x] NO    Nausea: [ ] YES [x ] NO           Vomiting: [ ] YES [x ] NO  Flatus: [x ] YES [ ] NO             Bowel Movement: [ ] YES [x ] NO     Void: [ x]YES [ ]No    Physical Exam:  General Appearance: Appears well, NAD  Neck: Supple  Chest: Equal expansion bilaterally  CV: Pulse regular presently  Abdomen: Soft, nontense, less tender  Extremities: Grossly symmetric, SCD's in place     LABS:                        9.4    4.2   )-----------( 267      ( 30 Nov 2017 06:34 )             30.4     11-30    140  |  102  |  4<L>  ----------------------------<  93  3.5   |  24  |  0.57    Ca    8.7      30 Nov 2017 06:34  Phos  4.3     11-30  Mg     2.3     11-30            RADIOLOGY & ADDITIONAL STUDIES:

## 2017-11-30 NOTE — PROGRESS NOTE ADULT - ASSESSMENT
46 yo F with hx right hemicolectomy presents with LUQ pain and is found to have narrowing at the level of the sigmoid.     1. SBO  - 11/25: CT - adynamic ileus  - 11/28: small bowel follow through performed  - GI consult   - Linzess     2. Hx of Right hemicolectomy  - adhesions from surgery likely cause of recurrent SBOs    3. Dispo: home 46 yo F with hx right hemicolectomy presents with LUQ pain and is found to have narrowing at the level of the sigmoid.     1. SBO  - 11/25: CT - adynamic ileus  - 11/28: small bowel follow through performed  - 11/29 anorectal manometry preformed  - GI consult   - Linzess     2. Hx of Right hemicolectomy  - adhesions from surgery likely cause of recurrent SBOs    3. Dispo: home

## 2017-12-01 LAB
ANION GAP SERPL CALC-SCNC: 13 MMOL/L — SIGNIFICANT CHANGE UP (ref 5–17)
BUN SERPL-MCNC: 4 MG/DL — LOW (ref 7–23)
CALCIUM SERPL-MCNC: 9 MG/DL — SIGNIFICANT CHANGE UP (ref 8.4–10.5)
CHLORIDE SERPL-SCNC: 103 MMOL/L — SIGNIFICANT CHANGE UP (ref 96–108)
CO2 SERPL-SCNC: 24 MMOL/L — SIGNIFICANT CHANGE UP (ref 22–31)
CREAT SERPL-MCNC: 0.63 MG/DL — SIGNIFICANT CHANGE UP (ref 0.5–1.3)
GLUCOSE SERPL-MCNC: 98 MG/DL — SIGNIFICANT CHANGE UP (ref 70–99)
HCT VFR BLD CALC: 30 % — LOW (ref 34.5–45)
HGB BLD-MCNC: 9.3 G/DL — LOW (ref 11.5–15.5)
MAGNESIUM SERPL-MCNC: 2.3 MG/DL — SIGNIFICANT CHANGE UP (ref 1.6–2.6)
MCHC RBC-ENTMCNC: 24.3 PG — LOW (ref 27–34)
MCHC RBC-ENTMCNC: 31 G/DL — LOW (ref 32–36)
MCV RBC AUTO: 78.3 FL — LOW (ref 80–100)
PHOSPHATE SERPL-MCNC: 4.8 MG/DL — HIGH (ref 2.5–4.5)
PLATELET # BLD AUTO: 274 K/UL — SIGNIFICANT CHANGE UP (ref 150–400)
POTASSIUM SERPL-MCNC: 3.7 MMOL/L — SIGNIFICANT CHANGE UP (ref 3.5–5.3)
POTASSIUM SERPL-SCNC: 3.7 MMOL/L — SIGNIFICANT CHANGE UP (ref 3.5–5.3)
RBC # BLD: 3.83 M/UL — SIGNIFICANT CHANGE UP (ref 3.8–5.2)
RBC # FLD: 16.2 % — SIGNIFICANT CHANGE UP (ref 10.3–16.9)
SODIUM SERPL-SCNC: 140 MMOL/L — SIGNIFICANT CHANGE UP (ref 135–145)
WBC # BLD: 4.8 K/UL — SIGNIFICANT CHANGE UP (ref 3.8–10.5)
WBC # FLD AUTO: 4.8 K/UL — SIGNIFICANT CHANGE UP (ref 3.8–10.5)

## 2017-12-01 RX ORDER — KETOROLAC TROMETHAMINE 30 MG/ML
15 SYRINGE (ML) INJECTION EVERY 6 HOURS
Qty: 0 | Refills: 0 | Status: DISCONTINUED | OUTPATIENT
Start: 2017-12-01 | End: 2017-12-04

## 2017-12-01 RX ORDER — GABAPENTIN 400 MG/1
300 CAPSULE ORAL THREE TIMES A DAY
Qty: 0 | Refills: 0 | Status: DISCONTINUED | OUTPATIENT
Start: 2017-12-01 | End: 2017-12-04

## 2017-12-01 RX ORDER — LINACLOTIDE 145 UG/1
145 CAPSULE, GELATIN COATED ORAL
Qty: 0 | Refills: 0 | Status: DISCONTINUED | OUTPATIENT
Start: 2017-12-02 | End: 2017-12-05

## 2017-12-01 RX ADMIN — HYDROMORPHONE HYDROCHLORIDE 1 MILLIGRAM(S): 2 INJECTION INTRAMUSCULAR; INTRAVENOUS; SUBCUTANEOUS at 03:26

## 2017-12-01 RX ADMIN — HYDROMORPHONE HYDROCHLORIDE 1 MILLIGRAM(S): 2 INJECTION INTRAMUSCULAR; INTRAVENOUS; SUBCUTANEOUS at 08:48

## 2017-12-01 RX ADMIN — Medication 15 MILLIGRAM(S): at 16:48

## 2017-12-01 RX ADMIN — LINACLOTIDE 290 MICROGRAM(S): 145 CAPSULE, GELATIN COATED ORAL at 06:38

## 2017-12-01 RX ADMIN — HYDROMORPHONE HYDROCHLORIDE 1 MILLIGRAM(S): 2 INJECTION INTRAMUSCULAR; INTRAVENOUS; SUBCUTANEOUS at 03:11

## 2017-12-01 RX ADMIN — HYDROMORPHONE HYDROCHLORIDE 1 MILLIGRAM(S): 2 INJECTION INTRAMUSCULAR; INTRAVENOUS; SUBCUTANEOUS at 21:10

## 2017-12-01 RX ADMIN — Medication 101 MILLIGRAM(S): at 03:15

## 2017-12-01 RX ADMIN — HEPARIN SODIUM 5000 UNIT(S): 5000 INJECTION INTRAVENOUS; SUBCUTANEOUS at 06:38

## 2017-12-01 RX ADMIN — ONDANSETRON 4 MILLIGRAM(S): 8 TABLET, FILM COATED ORAL at 08:45

## 2017-12-01 RX ADMIN — HEPARIN SODIUM 5000 UNIT(S): 5000 INJECTION INTRAVENOUS; SUBCUTANEOUS at 14:36

## 2017-12-01 RX ADMIN — SODIUM CHLORIDE 60 MILLILITER(S): 9 INJECTION, SOLUTION INTRAVENOUS at 21:06

## 2017-12-01 RX ADMIN — HYDROMORPHONE HYDROCHLORIDE 1 MILLIGRAM(S): 2 INJECTION INTRAMUSCULAR; INTRAVENOUS; SUBCUTANEOUS at 21:30

## 2017-12-01 RX ADMIN — Medication 15 MILLIGRAM(S): at 20:13

## 2017-12-01 RX ADMIN — HYDROMORPHONE HYDROCHLORIDE 0.5 MILLIGRAM(S): 2 INJECTION INTRAMUSCULAR; INTRAVENOUS; SUBCUTANEOUS at 11:29

## 2017-12-01 RX ADMIN — HYDROMORPHONE HYDROCHLORIDE 0.5 MILLIGRAM(S): 2 INJECTION INTRAMUSCULAR; INTRAVENOUS; SUBCUTANEOUS at 13:10

## 2017-12-01 RX ADMIN — Medication 101 MILLIGRAM(S): at 15:53

## 2017-12-01 RX ADMIN — Medication 15 MILLIGRAM(S): at 14:36

## 2017-12-01 RX ADMIN — GABAPENTIN 300 MILLIGRAM(S): 400 CAPSULE ORAL at 21:06

## 2017-12-01 RX ADMIN — HYDROMORPHONE HYDROCHLORIDE 1 MILLIGRAM(S): 2 INJECTION INTRAMUSCULAR; INTRAVENOUS; SUBCUTANEOUS at 13:14

## 2017-12-01 RX ADMIN — Medication 15 MILLIGRAM(S): at 20:45

## 2017-12-01 RX ADMIN — HYDROMORPHONE HYDROCHLORIDE 1 MILLIGRAM(S): 2 INJECTION INTRAMUSCULAR; INTRAVENOUS; SUBCUTANEOUS at 14:04

## 2017-12-01 RX ADMIN — HYDROMORPHONE HYDROCHLORIDE 1 MILLIGRAM(S): 2 INJECTION INTRAMUSCULAR; INTRAVENOUS; SUBCUTANEOUS at 10:14

## 2017-12-01 RX ADMIN — HEPARIN SODIUM 5000 UNIT(S): 5000 INJECTION INTRAVENOUS; SUBCUTANEOUS at 21:06

## 2017-12-01 NOTE — PROGRESS NOTE ADULT - SUBJECTIVE AND OBJECTIVE BOX
Vital Signs Last 24 Hrs  T(C): 37 (01 Dec 2017 12:16), Max: 37.2 (30 Nov 2017 16:56)  T(F): 98.6 (01 Dec 2017 12:16), Max: 98.9 (30 Nov 2017 16:56)  HR: 80 (01 Dec 2017 12:16) (72 - 87)  BP: 106/72 (01 Dec 2017 12:16) (93/60 - 118/81)  BP(mean): --  RR: 18 (01 Dec 2017 12:16) (16 - 18)  SpO2: 100% (01 Dec 2017 12:16) (99% - 100%)    I&O's Summary    30 Nov 2017 07:01  -  01 Dec 2017 07:00  --------------------------------------------------------  IN: 2640 mL / OUT: 400 mL / NET: 2240 mL    01 Dec 2017 07:01  -  01 Dec 2017 15:01  --------------------------------------------------------  IN: 720 mL / OUT: 400 mL / NET: 320 mL        SUBJECTIVE: Pt seen and examined at bedside. Continued pain.     Pain: [x ] YES [ ] NO  Pain (0-10):              Pain Control Adequate: [ ] YES [x ] NO  SOB: [ ]YES [x ] NO  Chest Discomfort: [ ] YES [x ] NO    Nausea: [ ] YES [x ] NO           Vomiting: [ ] YES [x ] NO  Flatus: [ x] YES [ ] NO             Bowel Movement: [ ] YES [x ] NO     Void: [x ]YES [ ]No    Physical Exam:  General Appearance: Appears well, NAD  Neck: Supple  Chest: Equal expansion bilaterally  CV: Pulse regular presently  Abdomen: Soft, nontense, tender in all quadrants.   Extremities: Grossly symmetric, SCD's in place     LABS:                        9.3    4.8   )-----------( 274      ( 01 Dec 2017 08:25 )             30.0     12-01    140  |  103  |  4<L>  ----------------------------<  98  3.7   |  24  |  0.63    Ca    9.0      01 Dec 2017 08:25  Phos  4.8     12-01  Mg     2.3     12-01            RADIOLOGY & ADDITIONAL STUDIES:

## 2017-12-01 NOTE — PROGRESS NOTE ADULT - ASSESSMENT
48 YO F with long standing history of obstructive symptoms p/w LUQ, nausea, and vomiting and was admitted to surgery for SBO. GI consulted for possible Hirschsprung's disease seen on OSH pathology.     # Abdominal pain, SBO, r/o Hirschsprung's disease  - Recommend decreasing Linaclotide to 145 mcg daily to decrease abdominal pain which may be associated with increased motility on medication  - ARM shows hypotensive anorectal sphincter pressures, weak augmentation of sphincter tone, pelvic floor dyssynergia, and mildly impaired intrarectal sensation  - Pt would likely benefit from biofeedback tehrapy    Case d/w Dr. Asad GOLDBERG will follow

## 2017-12-01 NOTE — CONSULT NOTE ADULT - SUBJECTIVE AND OBJECTIVE BOX
Pain Management Consult Note - [facility] [phone]    Chief Complaint: acute LUQ abdominal pain    HPI: Pt is a 46 yo F with a past medical history significant for small bowel obstruction in childhood requiring multiple abdominal surgeries including colostomy and chronic abdominal pain and dysmotility issues who presents with complaints of acute onset LUQ abdominal pain. She states that the pain is a 10/10, worse with palpation, movement and        Pain is ___ sharp ____dull ___burning __+_achy ___ Intensity: ____ mild ___mod __+_severe     Location: LUQ    Worse with palpation and movement    Improved with ____medication ____rest ____physical therapy    ROS: Const:  ___febrile   Eyes:___ENT:___CV: ___chest pain  Resp: ____sob  GI:___nausea ___vomiting ___abd pain ___npo ___clears __full diet __bm  :___ Musk: ___pain ___spasm  Skin:___ Neuro:  ___sedation___confusion___ numbness ___weakness ___paresth  Psych:__anxiety  Endo:___ Heme:___Allergy:_________, ___all others reviewed and negative    PAST MEDICAL & SURGICAL HISTORY:  No significant past medical history  History of colon resection      SH: ___Tobacco   ___Alcohol                          FH: FAMILY HISTORY:      dextrose 5% + sodium chloride 0.45%. 1000 milliLiter(s) IV Continuous <Continuous>  diphenhydrAMINE  IVPB 25 milliGRAM(s) IV Intermittent every 12 hours PRN  heparin  Injectable 5000 Unit(s) SubCutaneous every 8 hours  hydrocortisone 0.5% Cream 1 Application(s) Topical four times a day PRN  HYDROmorphone  Injectable 0.5 milliGRAM(s) IV Push every 4 hours PRN  HYDROmorphone  Injectable 1 milliGRAM(s) IV Push every 4 hours PRN  ketorolac   Injectable 15 milliGRAM(s) IV Push every 6 hours  linaclotide 290 MICROGram(s) Oral before breakfast  ondansetron Injectable 4 milliGRAM(s) IV Push every 6 hours PRN      T(C): 37 (12-01-17 @ 12:16), Max: 37.2 (11-30-17 @ 16:56)  HR: 80 (12-01-17 @ 12:16) (72 - 87)  BP: 106/72 (12-01-17 @ 12:16) (93/60 - 118/81)  RR: 18 (12-01-17 @ 12:16) (16 - 18)  SpO2: 100% (12-01-17 @ 12:16) (99% - 100%)  Wt(kg): --    T(C): 37 (12-01-17 @ 12:16), Max: 37.2 (11-30-17 @ 16:56)  HR: 80 (12-01-17 @ 12:16) (72 - 87)  BP: 106/72 (12-01-17 @ 12:16) (93/60 - 118/81)  RR: 18 (12-01-17 @ 12:16) (16 - 18)  SpO2: 100% (12-01-17 @ 12:16) (99% - 100%)  Wt(kg): --    T(C): 37 (12-01-17 @ 12:16), Max: 37.2 (11-30-17 @ 16:56)  HR: 80 (12-01-17 @ 12:16) (72 - 87)  BP: 106/72 (12-01-17 @ 12:16) (93/60 - 118/81)  RR: 18 (12-01-17 @ 12:16) (16 - 18)  SpO2: 100% (12-01-17 @ 12:16) (99% - 100%)  Wt(kg): --    PHYSICAL EXAM:  Gen Appearance: ___no acute distress ___appropriate        Neuro: ___SILT feet____ EOM Intact Psych: AAOX__, ___mood/affect appropriate        Eyes: ___conjunctiva WNL  _____ Pupils equal and round        ENT: ___ears and nose atraumatic___ Hearing grossly intact        Neck: ___trachea midline, no visible masses ___thyroid without palpable mass    Resp: ___Nml WOB____No tactile fremitus ___clear to auscultation    Cardio: ___extremities free from edema ____pedal pulses palpable    GI/Abdomen: ___soft _____ Nontender______Nondistended_____HSM    Lymphatic: ___no palpable nodes in neck  ___no palpable nodes calves and feet    Skin/Wound: ___Incision, ___Dressing c/d/i,   ____surrounding tissues soft,  ___drain/chest tube present____    Muscular: EHL ___/5  Gastrocnemius___/5    ___absent clubbing/cyanosis      ASSESSMENT: This is a 47y old Female with a history of   SMALL BOWEL OBSTRUCTION  No h/o HF  Handoff  MEWS Score  No significant past medical history  SBO (small bowel obstruction)  History of colon resection  ABDOMINAL PAIN      Recommended Treatment PLAN: Pain Management Consult Note - [facility] [phone]    Chief Complaint: acute LUQ abdominal pain    HPI: Pt is a 48 yo F with a past medical history significant for small bowel obstruction in childhood requiring multiple abdominal surgeries including hemicolectomy requiring colostomy and reversal and subsequent chronic abdominal pain and dysmotility issues who presents with complaints of acute onset LUQ abdominal pain. She states that the pain is a 10/10, worse with palpation and movement. She states that she normally only takes advil PM at home for pain or medications that have been prescribed for a short amount of time after recent hospitalizations. As per ISTOP she has been prescribed short courses (1 week in length) of Dilaudid 2-4mg PO. She reports having 1 liquid bowel movements this morning, she reports nausea w/o vomiting and decreased PO intake. She denies headache, chest pain and SOB.       Pain is ___ sharp ____dull ___burning __+_achy ___ Intensity: ____ mild ___mod __+_severe     Location: LUQ    Worse with palpation and movement.     Improved with ___+ _medication ____rest ____physical therapy    ROS: Const:  __-__febrile   Eyes:__-_ENT:___CV: _-__chest pain  Resp: _-___sob  GI:_+__nausea _=__vomiting __++_abd pain ___npo __+_clears __full diet _+ (liquid)_bm  :__-_ Musk: _-__pain ___spasm  Skin:___ Neuro:  _-__jsrfzpdp___-xikxdjyug___- numbness _-__weakness _-__paresth  Psych:__--_anxiety  Endo:___ Heme:___Allergy:_________, _--__all others reviewed and negative    PAST MEDICAL & SURGICAL HISTORY:  No significant past medical history  History of colon resection      SH: _denies__Tobacco   _denies__Alcohol                          FH: FAMILY HISTORY: no significant past family history of abdominal pain      dextrose 5% + sodium chloride 0.45%. 1000 milliLiter(s) IV Continuous <Continuous>  diphenhydrAMINE  IVPB 25 milliGRAM(s) IV Intermittent every 12 hours PRN  heparin  Injectable 5000 Unit(s) SubCutaneous every 8 hours  hydrocortisone 0.5% Cream 1 Application(s) Topical four times a day PRN  HYDROmorphone  Injectable 0.5 milliGRAM(s) IV Push every 4 hours PRN  HYDROmorphone  Injectable 1 milliGRAM(s) IV Push every 4 hours PRN  ketorolac   Injectable 15 milliGRAM(s) IV Push every 6 hours  linaclotide 290 MICROGram(s) Oral before breakfast  ondansetron Injectable 4 milliGRAM(s) IV Push every 6 hours PRN      T(C): 37 (12-01-17 @ 12:16), Max: 37.2 (11-30-17 @ 16:56)  HR: 80 (12-01-17 @ 12:16) (72 - 87)  BP: 106/72 (12-01-17 @ 12:16) (93/60 - 118/81)  RR: 18 (12-01-17 @ 12:16) (16 - 18)  SpO2: 100% (12-01-17 @ 12:16) (99% - 100%)  Wt(kg): --    T(C): 37 (12-01-17 @ 12:16), Max: 37.2 (11-30-17 @ 16:56)  HR: 80 (12-01-17 @ 12:16) (72 - 87)  BP: 106/72 (12-01-17 @ 12:16) (93/60 - 118/81)  RR: 18 (12-01-17 @ 12:16) (16 - 18)  SpO2: 100% (12-01-17 @ 12:16) (99% - 100%)  Wt(kg): --    T(C): 37 (12-01-17 @ 12:16), Max: 37.2 (11-30-17 @ 16:56)  HR: 80 (12-01-17 @ 12:16) (72 - 87)  BP: 106/72 (12-01-17 @ 12:16) (93/60 - 118/81)  RR: 18 (12-01-17 @ 12:16) (16 - 18)  SpO2: 100% (12-01-17 @ 12:16) (99% - 100%)  Wt(kg): --    PHYSICAL EXAM:  Gen Appearance: __+_no acute distress _+__appropriate        Neuro: _+__SILT feet___+_ EOM Intact Psych: AAOX_3_,_+_mood/affect appropriate        Eyes: _+__conjunctiva WNL  __+___ Pupils equal and round        ENT: ___ears and nose atraumatic__+_ Hearing grossly intact        Neck: +___trachea midline, no visible masses ___thyroid without palpable mass    Resp: __+_Nml WOB____No tactile fremitus __+_clear to auscultation    Cardio: _+__extremities free from edema ___+_pedal pulses palpable    GI/Abdomen: _+__soft LUQ tenderness to light palpation     Skin: warm, dry    Muscular: EHL __5_/5   _+__absent clubbing/cyanosis      < from: CT Abdomen and Pelvis w/ Oral Cont and w/ IV Cont (11.25.17 @ 15:45) >  EXAM:  CT ABDOMEN AND PELVIS OC IC                          PROCEDURE DATE:  11/25/2017    Quantity of Contrast in Vial in ml: 100 Contrast Used: Optiray 350  Quantity of Contrast Wasted in ml: 6     < from: CT Abdomen and Pelvis w/ Oral Cont and w/ IV Cont (11.25.17 @ 15:45) >  IMPRESSION:  1. Diffuse distention of multiple distal loops of small bowel, with   nonobstructive retrograde passage of enteric contrast through the   enterocolonic anastomotic site. Findings are most suggestive of an   adynamic ileus.    2. No bowelwall thickening, ascites or pneumoperitoneum is seen.  No   organized or peripherally enhancing fluid collection in the abdomen or   pelvis.      Above findings were discussed in person with Dr. Wayne Archer at time and   date of this dictation 11/26/2017 1:40 PM.          < from: Xray Abdomen Series Flat/Upright 2 View (11.26.17 @ 17:20) >  EXAM:  XR ABDOMEN-WDECUB - ERECT                          PROCEDURE DATE:  11/26/2017           < end of copied text >  < from: Xray Abdomen Series Flat/Upright 2 View (11.26.17 @ 17:20) >  IMPRESSION: Findings suggestive of a possible small bowel obstruction   pattern versus paralytic ileus. Continued additional follow-up upright   and supine imaging and/or further evaluation with a CT scan may be in   order.     < end of copied text >            ASSESSMENT: This is a 47y old Female with a history of small bowel obstruction s/p colectomy and subsequent abdominal pain and dysmotility who presented with acute LUQ abdominal pain found to have evidence of adynamic ileus still in abdominal pain.     Recommended Treatment PLAN:    -recommend avoiding opioids if possible given ileus Pain Management Consult Note - [facility] [phone]    Chief Complaint: acute LUQ abdominal pain    HPI: Pt is a 48 yo F with a past medical history significant for small bowel obstruction in childhood requiring multiple abdominal surgeries including hemicolectomy requiring colostomy and reversal and subsequent chronic abdominal pain and dysmotility issues who presents with complaints of acute onset LUQ abdominal pain. She states that the pain is a 10/10, worse with palpation and movement. She states that she normally only takes advil PM at home for pain or medications that have been prescribed for a short amount of time after recent hospitalizations. As per ISTOP she has been prescribed short courses (1 week in length) of Dilaudid 2-4mg PO. She reports having 1 liquid bowel movements this morning, she reports nausea w/o vomiting and decreased PO intake. She denies headache, chest pain and SOB.       Pain is ___ sharp ____dull ___burning __+_achy ___ Intensity: ____ mild ___mod __+_severe     Location: LUQ    Worse with palpation and movement.     Improved with ___+ _medication ____rest ____physical therapy    ROS: Const:  __-__febrile   Eyes:__-_ENT:___CV: _-__chest pain  Resp: _-___sob  GI:_+__nausea _=__vomiting __++_abd pain ___npo __+_clears __full diet _+ (liquid)_bm  :__-_ Musk: _-__pain ___spasm  Skin:___ Neuro:  _-__ngvditoa___-cvnkxdopy___- numbness _-__weakness _-__paresth  Psych:__--_anxiety  Endo:___ Heme:___Allergy:_________, _--__all others reviewed and negative    PAST MEDICAL & SURGICAL HISTORY:  No significant past medical history  History of colon resection      SH: _denies__Tobacco   _denies__Alcohol                          FH: FAMILY HISTORY: no significant past family history of abdominal pain      dextrose 5% + sodium chloride 0.45%. 1000 milliLiter(s) IV Continuous <Continuous>  diphenhydrAMINE  IVPB 25 milliGRAM(s) IV Intermittent every 12 hours PRN  heparin  Injectable 5000 Unit(s) SubCutaneous every 8 hours  hydrocortisone 0.5% Cream 1 Application(s) Topical four times a day PRN  HYDROmorphone  Injectable 0.5 milliGRAM(s) IV Push every 4 hours PRN  HYDROmorphone  Injectable 1 milliGRAM(s) IV Push every 4 hours PRN  ketorolac   Injectable 15 milliGRAM(s) IV Push every 6 hours  linaclotide 290 MICROGram(s) Oral before breakfast  ondansetron Injectable 4 milliGRAM(s) IV Push every 6 hours PRN      T(C): 37 (12-01-17 @ 12:16), Max: 37.2 (11-30-17 @ 16:56)  HR: 80 (12-01-17 @ 12:16) (72 - 87)  BP: 106/72 (12-01-17 @ 12:16) (93/60 - 118/81)  RR: 18 (12-01-17 @ 12:16) (16 - 18)  SpO2: 100% (12-01-17 @ 12:16) (99% - 100%)  Wt(kg): --    T(C): 37 (12-01-17 @ 12:16), Max: 37.2 (11-30-17 @ 16:56)  HR: 80 (12-01-17 @ 12:16) (72 - 87)  BP: 106/72 (12-01-17 @ 12:16) (93/60 - 118/81)  RR: 18 (12-01-17 @ 12:16) (16 - 18)  SpO2: 100% (12-01-17 @ 12:16) (99% - 100%)  Wt(kg): --    T(C): 37 (12-01-17 @ 12:16), Max: 37.2 (11-30-17 @ 16:56)  HR: 80 (12-01-17 @ 12:16) (72 - 87)  BP: 106/72 (12-01-17 @ 12:16) (93/60 - 118/81)  RR: 18 (12-01-17 @ 12:16) (16 - 18)  SpO2: 100% (12-01-17 @ 12:16) (99% - 100%)  Wt(kg): --    PHYSICAL EXAM:  Gen Appearance: __+_no acute distress _+__appropriate        Neuro: _+__SILT feet___+_ EOM Intact Psych: AAOX_3_,_+_mood/affect appropriate        Eyes: _+__conjunctiva WNL  __+___ Pupils equal and round        ENT: ___ears and nose atraumatic__+_ Hearing grossly intact        Neck: +___trachea midline, no visible masses ___thyroid without palpable mass    Resp: __+_Nml WOB____No tactile fremitus __+_clear to auscultation    Cardio: _+__extremities free from edema ___+_pedal pulses palpable    GI/Abdomen: _+__soft LUQ tenderness to light palpation     Skin: warm, dry    Muscular: EHL __5_/5   _+__absent clubbing/cyanosis      < from: CT Abdomen and Pelvis w/ Oral Cont and w/ IV Cont (11.25.17 @ 15:45) >  EXAM:  CT ABDOMEN AND PELVIS OC IC                          PROCEDURE DATE:  11/25/2017    Quantity of Contrast in Vial in ml: 100 Contrast Used: Optiray 350  Quantity of Contrast Wasted in ml: 6     < from: CT Abdomen and Pelvis w/ Oral Cont and w/ IV Cont (11.25.17 @ 15:45) >  IMPRESSION:  1. Diffuse distention of multiple distal loops of small bowel, with   nonobstructive retrograde passage of enteric contrast through the   enterocolonic anastomotic site. Findings are most suggestive of an   adynamic ileus.    2. No bowelwall thickening, ascites or pneumoperitoneum is seen.  No   organized or peripherally enhancing fluid collection in the abdomen or   pelvis.      Above findings were discussed in person with Dr. Wayne Archer at time and   date of this dictation 11/26/2017 1:40 PM.          < from: Xray Abdomen Series Flat/Upright 2 View (11.26.17 @ 17:20) >  EXAM:  XR ABDOMEN-WDECUB - ERECT                          PROCEDURE DATE:  11/26/2017           < end of copied text >  < from: Xray Abdomen Series Flat/Upright 2 View (11.26.17 @ 17:20) >  IMPRESSION: Findings suggestive of a possible small bowel obstruction   pattern versus paralytic ileus. Continued additional follow-up upright   and supine imaging and/or further evaluation with a CT scan may be in   order.     < end of copied text >            ASSESSMENT: This is a 47y old Female with a history of small bowel obstruction s/p colectomy and subsequent abdominal pain and dysmotility who presented with acute LUQ abdominal pain found to have evidence of adynamic ileus still in abdominal pain.     Recommended Treatment PLAN: Pain Management Consult Note - [facility] [phone]    Chief Complaint: acute LUQ abdominal pain    HPI: Pt is a 48 yo F with a past medical history significant for small bowel obstruction in childhood requiring multiple abdominal surgeries including hemicolectomy requiring colostomy and reversal and subsequent chronic abdominal pain and dysmotility issues who presents with complaints of acute onset LUQ abdominal pain. She states that the pain is a 10/10, worse with palpation and movement. She states that she normally only takes advil PM at home for pain or medications that have been prescribed for a short amount of time after recent hospitalizations. As per ISTOP she has been prescribed short courses (1 week in length) of Dilaudid 2-4mg PO. She reports having 1 liquid bowel movements this morning, she reports nausea w/o vomiting and decreased PO intake. She denies headache, chest pain and SOB.       Pain is ___ sharp ____dull ___burning __+_achy ___ Intensity: ____ mild ___mod __+_severe     Location: LUQ    Worse with palpation and movement.     Improved with ___+ _medication ____rest ____physical therapy    ROS: Const:  __-__febrile   Eyes:__-_ENT:___CV: _-__chest pain  Resp: _-___sob  GI:_+__nausea _=__vomiting __++_abd pain ___npo __+_clears __full diet _+ (liquid)_bm  :__-_ Musk: _-__pain ___spasm  Skin:___ Neuro:  _-__csarhugn___-qslwikesx___- numbness _-__weakness _-__paresth  Psych:__--_anxiety  Endo:___ Heme:___Allergy:_________, _--__all others reviewed and negative    PAST MEDICAL & SURGICAL HISTORY:  No significant past medical history  History of colon resection      SH: _denies__Tobacco   _denies__Alcohol                          FH: FAMILY HISTORY: no significant past family history of abdominal pain      dextrose 5% + sodium chloride 0.45%. 1000 milliLiter(s) IV Continuous <Continuous>  diphenhydrAMINE  IVPB 25 milliGRAM(s) IV Intermittent every 12 hours PRN  heparin  Injectable 5000 Unit(s) SubCutaneous every 8 hours  hydrocortisone 0.5% Cream 1 Application(s) Topical four times a day PRN  HYDROmorphone  Injectable 0.5 milliGRAM(s) IV Push every 4 hours PRN  HYDROmorphone  Injectable 1 milliGRAM(s) IV Push every 4 hours PRN  ketorolac   Injectable 15 milliGRAM(s) IV Push every 6 hours  linaclotide 290 MICROGram(s) Oral before breakfast  ondansetron Injectable 4 milliGRAM(s) IV Push every 6 hours PRN      T(C): 37 (12-01-17 @ 12:16), Max: 37.2 (11-30-17 @ 16:56)  HR: 80 (12-01-17 @ 12:16) (72 - 87)  BP: 106/72 (12-01-17 @ 12:16) (93/60 - 118/81)  RR: 18 (12-01-17 @ 12:16) (16 - 18)  SpO2: 100% (12-01-17 @ 12:16) (99% - 100%)  Wt(kg): --    T(C): 37 (12-01-17 @ 12:16), Max: 37.2 (11-30-17 @ 16:56)  HR: 80 (12-01-17 @ 12:16) (72 - 87)  BP: 106/72 (12-01-17 @ 12:16) (93/60 - 118/81)  RR: 18 (12-01-17 @ 12:16) (16 - 18)  SpO2: 100% (12-01-17 @ 12:16) (99% - 100%)  Wt(kg): --    T(C): 37 (12-01-17 @ 12:16), Max: 37.2 (11-30-17 @ 16:56)  HR: 80 (12-01-17 @ 12:16) (72 - 87)  BP: 106/72 (12-01-17 @ 12:16) (93/60 - 118/81)  RR: 18 (12-01-17 @ 12:16) (16 - 18)  SpO2: 100% (12-01-17 @ 12:16) (99% - 100%)  Wt(kg): --    PHYSICAL EXAM:  Gen Appearance: __+_no acute distress _+__appropriate        Neuro: _+__SILT feet___+_ EOM Intact Psych: AAOX_3_,_+_mood/affect appropriate        Eyes: _+__conjunctiva WNL  __+___ Pupils equal and round        ENT: ___ears and nose atraumatic__+_ Hearing grossly intact        Neck: +___trachea midline, no visible masses ___thyroid without palpable mass    Resp: __+_Nml WOB____No tactile fremitus __+_clear to auscultation    Cardio: _+__extremities free from edema ___+_pedal pulses palpable    GI/Abdomen: _+__soft LUQ tenderness to light palpation     Skin: warm, dry    Muscular: EHL __5_/5   _+__absent clubbing/cyanosis      < from: CT Abdomen and Pelvis w/ Oral Cont and w/ IV Cont (11.25.17 @ 15:45) >  EXAM:  CT ABDOMEN AND PELVIS OC IC                          PROCEDURE DATE:  11/25/2017    Quantity of Contrast in Vial in ml: 100 Contrast Used: Optiray 350  Quantity of Contrast Wasted in ml: 6     < from: CT Abdomen and Pelvis w/ Oral Cont and w/ IV Cont (11.25.17 @ 15:45) >  IMPRESSION:  1. Diffuse distention of multiple distal loops of small bowel, with   nonobstructive retrograde passage of enteric contrast through the   enterocolonic anastomotic site. Findings are most suggestive of an   adynamic ileus.    2. No bowelwall thickening, ascites or pneumoperitoneum is seen.  No   organized or peripherally enhancing fluid collection in the abdomen or   pelvis.      Above findings were discussed in person with Dr. Wayne Archer at time and   date of this dictation 11/26/2017 1:40 PM.          < from: Xray Abdomen Series Flat/Upright 2 View (11.26.17 @ 17:20) >  EXAM:  XR ABDOMEN-WDECUB - ERECT                          PROCEDURE DATE:  11/26/2017           < end of copied text >  < from: Xray Abdomen Series Flat/Upright 2 View (11.26.17 @ 17:20) >  IMPRESSION: Findings suggestive of a possible small bowel obstruction   pattern versus paralytic ileus. Continued additional follow-up upright   and supine imaging and/or further evaluation with a CT scan may be in   order.     < end of copied text >            ASSESSMENT: This is a 47y old Female with a history of small bowel obstruction s/p colectomy and subsequent abdominal pain and dysmotility who presented with acute LUQ abdominal pain found to have evidence of adynamic ileus still in abdominal pain.     Recommended Treatment PLAN:    -suggest minimizing opioids if possible as opioid use is likely contributing to dysmotility issues   -suggest gabapentin 300mg TID with plan to increase as tolerated to 600mg TID after 2 days  -suggest Tylenol 975mg Q8hrs  -suggest Lidoderm patch  -defer to GI for continued management of chronic dysmotility

## 2017-12-01 NOTE — PROGRESS NOTE ADULT - SUBJECTIVE AND OBJECTIVE BOX
Pt seen and examined at bedside. HERBERT overnight. Pt states that she was feeling better up until this morning when she had severe abdominal pain associated with nausea. Pt states that she had a liquid BM yesterday evening. Denies CP, SOB, vomiting.     Allergies  No Known Allergies    MEDICATIONS:  MEDICATIONS  (STANDING):  dextrose 5% + sodium chloride 0.45%. 1000 milliLiter(s) (60 mL/Hr) IV Continuous <Continuous>  heparin  Injectable 5000 Unit(s) SubCutaneous every 8 hours  linaclotide 290 MICROGram(s) Oral before breakfast    MEDICATIONS  (PRN):  diphenhydrAMINE  IVPB 25 milliGRAM(s) IV Intermittent every 12 hours PRN Itching  hydrocortisone 0.5% Cream 1 Application(s) Topical four times a day PRN Rash and/or Itching  HYDROmorphone  Injectable 0.5 milliGRAM(s) IV Push every 4 hours PRN Moderate Pain  HYDROmorphone  Injectable 1 milliGRAM(s) IV Push every 4 hours PRN Severe Pain  ondansetron Injectable 4 milliGRAM(s) IV Push every 6 hours PRN Nausea    Vital Signs Last 24 Hrs  T(C): 36.5 (01 Dec 2017 08:38), Max: 37.2 (30 Nov 2017 12:45)  T(F): 97.7 (01 Dec 2017 08:38), Max: 99 (30 Nov 2017 12:45)  HR: 87 (01 Dec 2017 08:38) (72 - 87)  BP: 107/73 (01 Dec 2017 08:38) (93/60 - 118/81)  BP(mean): --  RR: 17 (01 Dec 2017 08:38) (16 - 17)  SpO2: 100% (01 Dec 2017 08:38) (99% - 100%)    11-30 @ 07:01  -  12-01 @ 07:00  --------------------------------------------------------  IN: 2640 mL / OUT: 400 mL / NET: 2240 mL    PHYSICAL EXAM:    General: Well developed; well nourished; in no acute distress  HEENT: MMM, conjunctiva and sclera clear  Gastrointestinal: Soft, LUQ TTP with guarding non-distended; Midline scar well healed; No rebound  Extremities: Normal range of motion, No clubbing, cyanosis or edema    LABS:                        9.3    4.8   )-----------( 274      ( 01 Dec 2017 08:25 )             30.0     12-01    140  |  103  |  4<L>  ----------------------------<  98  3.7   |  24  |  0.63    Ca    9.0      01 Dec 2017 08:25  Phos  4.3     11-30  Mg     2.3     11-30    RADIOLOGY & ADDITIONAL STUDIES:

## 2017-12-01 NOTE — PROGRESS NOTE ADULT - ASSESSMENT
48 yo F with hx right hemicolectomy presents with LUQ pain and is found to have narrowing at the level of the sigmoid.     1. SBO  - 11/25: CT - adynamic ileus  - 11/28: small bowel follow through performed- 6 hr transit time  - GI consult  - Linzess   -Pain management consult  - Toradol    2. Hx of Right hemicolectomy  - stable    3. Dispo: home

## 2017-12-02 LAB
ANION GAP SERPL CALC-SCNC: 14 MMOL/L — SIGNIFICANT CHANGE UP (ref 5–17)
BUN SERPL-MCNC: 4 MG/DL — LOW (ref 7–23)
CALCIUM SERPL-MCNC: 9.1 MG/DL — SIGNIFICANT CHANGE UP (ref 8.4–10.5)
CHLORIDE SERPL-SCNC: 108 MMOL/L — SIGNIFICANT CHANGE UP (ref 96–108)
CO2 SERPL-SCNC: 19 MMOL/L — LOW (ref 22–31)
CREAT SERPL-MCNC: 0.59 MG/DL — SIGNIFICANT CHANGE UP (ref 0.5–1.3)
GLUCOSE SERPL-MCNC: 95 MG/DL — SIGNIFICANT CHANGE UP (ref 70–99)
HCT VFR BLD CALC: 30.2 % — LOW (ref 34.5–45)
HGB BLD-MCNC: 9.2 G/DL — LOW (ref 11.5–15.5)
MAGNESIUM SERPL-MCNC: 2.3 MG/DL — SIGNIFICANT CHANGE UP (ref 1.6–2.6)
MCHC RBC-ENTMCNC: 24 PG — LOW (ref 27–34)
MCHC RBC-ENTMCNC: 30.5 G/DL — LOW (ref 32–36)
MCV RBC AUTO: 78.6 FL — LOW (ref 80–100)
PHOSPHATE SERPL-MCNC: 3.7 MG/DL — SIGNIFICANT CHANGE UP (ref 2.5–4.5)
PLATELET # BLD AUTO: 287 K/UL — SIGNIFICANT CHANGE UP (ref 150–400)
POTASSIUM SERPL-MCNC: 3.2 MMOL/L — LOW (ref 3.5–5.3)
POTASSIUM SERPL-SCNC: 3.2 MMOL/L — LOW (ref 3.5–5.3)
RBC # BLD: 3.84 M/UL — SIGNIFICANT CHANGE UP (ref 3.8–5.2)
RBC # FLD: 16.1 % — SIGNIFICANT CHANGE UP (ref 10.3–16.9)
SODIUM SERPL-SCNC: 141 MMOL/L — SIGNIFICANT CHANGE UP (ref 135–145)
WBC # BLD: 3.8 K/UL — SIGNIFICANT CHANGE UP (ref 3.8–10.5)
WBC # FLD AUTO: 3.8 K/UL — SIGNIFICANT CHANGE UP (ref 3.8–10.5)

## 2017-12-02 RX ORDER — ACETAMINOPHEN 500 MG
975 TABLET ORAL EVERY 8 HOURS
Qty: 0 | Refills: 0 | Status: DISCONTINUED | OUTPATIENT
Start: 2017-12-02 | End: 2017-12-04

## 2017-12-02 RX ORDER — HYDROMORPHONE HYDROCHLORIDE 2 MG/ML
0.5 INJECTION INTRAMUSCULAR; INTRAVENOUS; SUBCUTANEOUS ONCE
Qty: 0 | Refills: 0 | Status: DISCONTINUED | OUTPATIENT
Start: 2017-12-02 | End: 2017-12-02

## 2017-12-02 RX ORDER — OXYCODONE AND ACETAMINOPHEN 5; 325 MG/1; MG/1
1 TABLET ORAL ONCE
Qty: 0 | Refills: 0 | Status: DISCONTINUED | OUTPATIENT
Start: 2017-12-02 | End: 2017-12-02

## 2017-12-02 RX ORDER — ACETAMINOPHEN 500 MG
1000 TABLET ORAL ONCE
Qty: 0 | Refills: 0 | Status: COMPLETED | OUTPATIENT
Start: 2017-12-02 | End: 2017-12-02

## 2017-12-02 RX ORDER — LIDOCAINE 4 G/100G
1 CREAM TOPICAL DAILY
Qty: 0 | Refills: 0 | Status: DISCONTINUED | OUTPATIENT
Start: 2017-12-02 | End: 2017-12-05

## 2017-12-02 RX ORDER — ONDANSETRON 8 MG/1
4 TABLET, FILM COATED ORAL ONCE
Qty: 0 | Refills: 0 | Status: COMPLETED | OUTPATIENT
Start: 2017-12-02 | End: 2017-12-02

## 2017-12-02 RX ADMIN — OXYCODONE AND ACETAMINOPHEN 1 TABLET(S): 5; 325 TABLET ORAL at 12:40

## 2017-12-02 RX ADMIN — Medication 1000 MILLIGRAM(S): at 05:40

## 2017-12-02 RX ADMIN — HEPARIN SODIUM 5000 UNIT(S): 5000 INJECTION INTRAVENOUS; SUBCUTANEOUS at 21:10

## 2017-12-02 RX ADMIN — LINACLOTIDE 145 MICROGRAM(S): 145 CAPSULE, GELATIN COATED ORAL at 05:55

## 2017-12-02 RX ADMIN — Medication 101 MILLIGRAM(S): at 21:35

## 2017-12-02 RX ADMIN — Medication 15 MILLIGRAM(S): at 03:15

## 2017-12-02 RX ADMIN — Medication 975 MILLIGRAM(S): at 21:06

## 2017-12-02 RX ADMIN — HYDROMORPHONE HYDROCHLORIDE 0.5 MILLIGRAM(S): 2 INJECTION INTRAMUSCULAR; INTRAVENOUS; SUBCUTANEOUS at 22:30

## 2017-12-02 RX ADMIN — HEPARIN SODIUM 5000 UNIT(S): 5000 INJECTION INTRAVENOUS; SUBCUTANEOUS at 13:15

## 2017-12-02 RX ADMIN — HYDROMORPHONE HYDROCHLORIDE 0.5 MILLIGRAM(S): 2 INJECTION INTRAMUSCULAR; INTRAVENOUS; SUBCUTANEOUS at 14:28

## 2017-12-02 RX ADMIN — Medication 15 MILLIGRAM(S): at 21:35

## 2017-12-02 RX ADMIN — LIDOCAINE 1 PATCH: 4 CREAM TOPICAL at 19:26

## 2017-12-02 RX ADMIN — GABAPENTIN 300 MILLIGRAM(S): 400 CAPSULE ORAL at 05:53

## 2017-12-02 RX ADMIN — HYDROMORPHONE HYDROCHLORIDE 0.5 MILLIGRAM(S): 2 INJECTION INTRAMUSCULAR; INTRAVENOUS; SUBCUTANEOUS at 14:50

## 2017-12-02 RX ADMIN — SODIUM CHLORIDE 60 MILLILITER(S): 9 INJECTION, SOLUTION INTRAVENOUS at 21:07

## 2017-12-02 RX ADMIN — GABAPENTIN 300 MILLIGRAM(S): 400 CAPSULE ORAL at 21:06

## 2017-12-02 RX ADMIN — Medication 975 MILLIGRAM(S): at 22:09

## 2017-12-02 RX ADMIN — Medication 15 MILLIGRAM(S): at 21:06

## 2017-12-02 RX ADMIN — Medication 15 MILLIGRAM(S): at 09:15

## 2017-12-02 RX ADMIN — Medication 101 MILLIGRAM(S): at 05:54

## 2017-12-02 RX ADMIN — GABAPENTIN 300 MILLIGRAM(S): 400 CAPSULE ORAL at 13:15

## 2017-12-02 RX ADMIN — Medication 15 MILLIGRAM(S): at 03:00

## 2017-12-02 RX ADMIN — HEPARIN SODIUM 5000 UNIT(S): 5000 INJECTION INTRAVENOUS; SUBCUTANEOUS at 05:54

## 2017-12-02 RX ADMIN — HYDROMORPHONE HYDROCHLORIDE 0.5 MILLIGRAM(S): 2 INJECTION INTRAMUSCULAR; INTRAVENOUS; SUBCUTANEOUS at 22:45

## 2017-12-02 RX ADMIN — Medication 15 MILLIGRAM(S): at 09:05

## 2017-12-02 RX ADMIN — Medication 400 MILLIGRAM(S): at 05:17

## 2017-12-02 RX ADMIN — OXYCODONE AND ACETAMINOPHEN 1 TABLET(S): 5; 325 TABLET ORAL at 12:09

## 2017-12-02 NOTE — PROGRESS NOTE ADULT - SUBJECTIVE AND OBJECTIVE BOX
Pt seen and examined at bedside  Reports that she is still having her usual abdominal pain - not worse than before, minimal relief with current Rx  Tolerating po intake  Denies emesis, diarrhea fever, chills,     MEDICATIONS:  MEDICATIONS  (STANDING):  acetaminophen   Tablet. 975 milliGRAM(s) Oral every 8 hours  dextrose 5% + sodium chloride 0.45%. 1000 milliLiter(s) (60 mL/Hr) IV Continuous <Continuous>  gabapentin 300 milliGRAM(s) Oral three times a day  heparin  Injectable 5000 Unit(s) SubCutaneous every 8 hours  ketorolac   Injectable 15 milliGRAM(s) IV Push every 6 hours  lidocaine   Patch 1 Patch Transdermal daily  linaclotide 145 MICROGram(s) Oral before breakfast    MEDICATIONS  (PRN):  diphenhydrAMINE  IVPB 25 milliGRAM(s) IV Intermittent every 12 hours PRN Itching  hydrocortisone 0.5% Cream 1 Application(s) Topical four times a day PRN Rash and/or Itching  ondansetron Injectable 4 milliGRAM(s) IV Push every 6 hours PRN Nausea      Allergies  No Known Allergies    Intolerances      Vital Signs Last 24 Hrs  T(C): 37.2 (02 Dec 2017 20:38), Max: 37.2 (02 Dec 2017 20:38)  T(F): 98.9 (02 Dec 2017 20:38), Max: 98.9 (02 Dec 2017 20:38)  HR: 87 (02 Dec 2017 20:38) (73 - 91)  BP: 109/68 (02 Dec 2017 20:38) (101/68 - 110/72)  BP(mean): --  RR: 18 (02 Dec 2017 20:38) (16 - 18)  SpO2: 99% (02 Dec 2017 20:38) (99% - 100%)    12-01 @ 07:01  -  12-02 @ 07:00  --------------------------------------------------------  IN: 1560 mL / OUT: 850 mL / NET: 710 mL    12-02 @ 07:01  -  12-02 @ 23:43  --------------------------------------------------------  IN: 300 mL / OUT: 700 mL / NET: -400 mL      PHYSICAL EXAM:  General: Well developed; well nourished; in no acute distress  HEENT: MMM, conjunctiva and sclera clear  Gastrointestinal: Soft, LUQ TTP with guarding non-distended; Midline scar well healed; No rebound  Extremities: Normal range of motion, No clubbing, cyanosis or edema    LABS:  CBC Full  -  ( 02 Dec 2017 07:25 )  WBC Count : 3.8 K/uL  Hemoglobin : 9.2 g/dL  Hematocrit : 30.2 %  Platelet Count - Automated : 287 K/uL  Mean Cell Volume : 78.6 fL  Mean Cell Hemoglobin : 24.0 pg  Mean Cell Hemoglobin Concentration : 30.5 g/dL    141  |  108  |  4<L>  ----------------------------<  95  3.2<L>   |  19<L>  |  0.59    Ca    9.1      02 Dec 2017 07:21  Phos  3.7     12-02  Mg     2.3     12-02        RADIOLOGY & ADDITIONAL STUDIES (The following images were personally reviewed):

## 2017-12-02 NOTE — PROGRESS NOTE ADULT - ASSESSMENT
46 YO F with long standing history of obstructive symptoms p/w LUQ, nausea, and vomiting and was admitted to surgery for SBO. GI consulted for possible Hirschsprung's disease seen on OSH pathology.     # Abdominal pain, SBO, r/o Hirschsprung's disease  - Recommend decreasing Linaclotide to 145 mcg daily to decrease abdominal pain which may be associated with increased motility on medication  - ARM shows hypotensive anorectal sphincter pressures, weak augmentation of sphincter tone, pelvic floor dyssynergia, and mildly impaired intrarectal sensation  - Pt would likely benefit from biofeedback therapy    Discussed with attending Dr. Asad GOLDBERG following

## 2017-12-02 NOTE — PROGRESS NOTE ADULT - ASSESSMENT
46 yo F with hx right hemicolectomy presents with LUQ pain and is found to have narrowing at the level of the sigmoid.     1. SBO- likely due to dysmotility  - 11/25: CT - adynamic ileus  - 11/28: small bowel follow through performed  - GI following  - Linzess     2. Hx of Right hemicolectomy  - stable    3. Dispo: home

## 2017-12-02 NOTE — PROGRESS NOTE ADULT - SUBJECTIVE AND OBJECTIVE BOX
o/n: HERBERT, VSS  12/1: Pain improved with toradol. Pain management recs above. o/n: HERBERT, VSS  12/1: Pain improved with toradol. Pain management recs above.        SUBJECTIVE: Patient seen and examined bedside by chief resident. Complaining of intermittent pain related to food.    heparin  Injectable 5000 Unit(s) SubCutaneous every 8 hours      Vital Signs Last 24 Hrs  T(C): 36.5 (02 Dec 2017 13:40), Max: 36.8 (01 Dec 2017 21:07)  T(F): 97.7 (02 Dec 2017 13:40), Max: 98.3 (02 Dec 2017 08:53)  HR: 81 (02 Dec 2017 13:40) (73 - 91)  BP: 104/68 (02 Dec 2017 13:40) (90/60 - 124/73)  BP(mean): --  RR: 17 (02 Dec 2017 13:40) (16 - 17)  SpO2: 100% (02 Dec 2017 13:40) (100% - 100%)  I&O's Detail    01 Dec 2017 07:01  -  02 Dec 2017 07:00  --------------------------------------------------------  IN:    dextrose 5% + sodium chloride 0.45%.: 1320 mL    Oral Fluid: 240 mL  Total IN: 1560 mL    OUT:    Voided: 850 mL  Total OUT: 850 mL    Total NET: 710 mL      02 Dec 2017 07:01  -  02 Dec 2017 13:46  --------------------------------------------------------  IN:    Oral Fluid: 300 mL  Total IN: 300 mL    OUT:    Voided: 700 mL  Total OUT: 700 mL    Total NET: -400 mL          General: NAD, resting comfortably in bed  C/V: NSR  Pulm: Nonlabored breathing, no respiratory distress  Abd: soft, NT/ND.  Extrem: WWP, no edema, SCDs in place        LABS:                        9.2    3.8   )-----------( 287      ( 02 Dec 2017 07:25 )             30.2     12-02    141  |  108  |  4<L>  ----------------------------<  95  3.2<L>   |  19<L>  |  0.59    Ca    9.1      02 Dec 2017 07:21  Phos  3.7     12-02  Mg     2.3     12-02            RADIOLOGY & ADDITIONAL STUDIES:

## 2017-12-03 LAB
ANION GAP SERPL CALC-SCNC: 12 MMOL/L — SIGNIFICANT CHANGE UP (ref 5–17)
BUN SERPL-MCNC: 4 MG/DL — LOW (ref 7–23)
CALCIUM SERPL-MCNC: 9.1 MG/DL — SIGNIFICANT CHANGE UP (ref 8.4–10.5)
CHLORIDE SERPL-SCNC: 109 MMOL/L — HIGH (ref 96–108)
CO2 SERPL-SCNC: 23 MMOL/L — SIGNIFICANT CHANGE UP (ref 22–31)
CREAT SERPL-MCNC: 0.6 MG/DL — SIGNIFICANT CHANGE UP (ref 0.5–1.3)
GLUCOSE SERPL-MCNC: 83 MG/DL — SIGNIFICANT CHANGE UP (ref 70–99)
HCT VFR BLD CALC: 32 % — LOW (ref 34.5–45)
HGB BLD-MCNC: 9.7 G/DL — LOW (ref 11.5–15.5)
MAGNESIUM SERPL-MCNC: 2.3 MG/DL — SIGNIFICANT CHANGE UP (ref 1.6–2.6)
MCHC RBC-ENTMCNC: 23.8 PG — LOW (ref 27–34)
MCHC RBC-ENTMCNC: 30.3 G/DL — LOW (ref 32–36)
MCV RBC AUTO: 78.6 FL — LOW (ref 80–100)
PHOSPHATE SERPL-MCNC: 3.3 MG/DL — SIGNIFICANT CHANGE UP (ref 2.5–4.5)
PLATELET # BLD AUTO: 274 K/UL — SIGNIFICANT CHANGE UP (ref 150–400)
POTASSIUM SERPL-MCNC: 3.6 MMOL/L — SIGNIFICANT CHANGE UP (ref 3.5–5.3)
POTASSIUM SERPL-SCNC: 3.6 MMOL/L — SIGNIFICANT CHANGE UP (ref 3.5–5.3)
RBC # BLD: 4.07 M/UL — SIGNIFICANT CHANGE UP (ref 3.8–5.2)
RBC # FLD: 16.1 % — SIGNIFICANT CHANGE UP (ref 10.3–16.9)
SODIUM SERPL-SCNC: 144 MMOL/L — SIGNIFICANT CHANGE UP (ref 135–145)
WBC # BLD: 5 K/UL — SIGNIFICANT CHANGE UP (ref 3.8–10.5)
WBC # FLD AUTO: 5 K/UL — SIGNIFICANT CHANGE UP (ref 3.8–10.5)

## 2017-12-03 PROCEDURE — 74177 CT ABD & PELVIS W/CONTRAST: CPT | Mod: 26

## 2017-12-03 RX ORDER — HYDROMORPHONE HYDROCHLORIDE 2 MG/ML
0.5 INJECTION INTRAMUSCULAR; INTRAVENOUS; SUBCUTANEOUS ONCE
Qty: 0 | Refills: 0 | Status: DISCONTINUED | OUTPATIENT
Start: 2017-12-03 | End: 2017-12-03

## 2017-12-03 RX ORDER — RADIOPAQUE PVC MARKERS/BARIUM 24MARKERS
30 CAPSULE ORAL ONCE
Qty: 0 | Refills: 0 | Status: DISCONTINUED | OUTPATIENT
Start: 2017-12-03 | End: 2017-12-03

## 2017-12-03 RX ORDER — POTASSIUM CHLORIDE 20 MEQ
40 PACKET (EA) ORAL ONCE
Qty: 0 | Refills: 0 | Status: COMPLETED | OUTPATIENT
Start: 2017-12-03 | End: 2017-12-03

## 2017-12-03 RX ORDER — DIATRIZOATE MEGLUMINE 180 MG/ML
30 INJECTION, SOLUTION INTRAVESICAL ONCE
Qty: 0 | Refills: 0 | Status: COMPLETED | OUTPATIENT
Start: 2017-12-03 | End: 2017-12-03

## 2017-12-03 RX ORDER — RADIOPAQUE PVC MARKERS/BARIUM 24MARKERS
1350 CAPSULE ORAL ONCE
Qty: 0 | Refills: 0 | Status: DISCONTINUED | OUTPATIENT
Start: 2017-12-03 | End: 2017-12-03

## 2017-12-03 RX ORDER — DIATRIZOATE MEGLUMINE 180 MG/ML
30 INJECTION, SOLUTION INTRAVESICAL ONCE
Qty: 0 | Refills: 0 | Status: DISCONTINUED | OUTPATIENT
Start: 2017-12-03 | End: 2017-12-03

## 2017-12-03 RX ADMIN — HYDROMORPHONE HYDROCHLORIDE 0.5 MILLIGRAM(S): 2 INJECTION INTRAMUSCULAR; INTRAVENOUS; SUBCUTANEOUS at 11:54

## 2017-12-03 RX ADMIN — HEPARIN SODIUM 5000 UNIT(S): 5000 INJECTION INTRAVENOUS; SUBCUTANEOUS at 13:48

## 2017-12-03 RX ADMIN — Medication 975 MILLIGRAM(S): at 22:10

## 2017-12-03 RX ADMIN — GABAPENTIN 300 MILLIGRAM(S): 400 CAPSULE ORAL at 21:08

## 2017-12-03 RX ADMIN — Medication 975 MILLIGRAM(S): at 06:39

## 2017-12-03 RX ADMIN — LIDOCAINE 1 PATCH: 4 CREAM TOPICAL at 23:05

## 2017-12-03 RX ADMIN — GABAPENTIN 300 MILLIGRAM(S): 400 CAPSULE ORAL at 05:36

## 2017-12-03 RX ADMIN — Medication 15 MILLIGRAM(S): at 09:51

## 2017-12-03 RX ADMIN — Medication 975 MILLIGRAM(S): at 05:37

## 2017-12-03 RX ADMIN — HEPARIN SODIUM 5000 UNIT(S): 5000 INJECTION INTRAVENOUS; SUBCUTANEOUS at 21:08

## 2017-12-03 RX ADMIN — LIDOCAINE 1 PATCH: 4 CREAM TOPICAL at 11:08

## 2017-12-03 RX ADMIN — Medication 40 MILLIEQUIVALENT(S): at 11:54

## 2017-12-03 RX ADMIN — Medication 15 MILLIGRAM(S): at 23:54

## 2017-12-03 RX ADMIN — ONDANSETRON 4 MILLIGRAM(S): 8 TABLET, FILM COATED ORAL at 17:49

## 2017-12-03 RX ADMIN — HYDROMORPHONE HYDROCHLORIDE 0.5 MILLIGRAM(S): 2 INJECTION INTRAMUSCULAR; INTRAVENOUS; SUBCUTANEOUS at 21:43

## 2017-12-03 RX ADMIN — LIDOCAINE 1 PATCH: 4 CREAM TOPICAL at 07:40

## 2017-12-03 RX ADMIN — DIATRIZOATE MEGLUMINE 30 MILLILITER(S): 180 INJECTION, SOLUTION INTRAVESICAL at 14:42

## 2017-12-03 RX ADMIN — Medication 15 MILLIGRAM(S): at 17:49

## 2017-12-03 RX ADMIN — Medication 15 MILLIGRAM(S): at 02:22

## 2017-12-03 RX ADMIN — Medication 15 MILLIGRAM(S): at 02:46

## 2017-12-03 RX ADMIN — Medication 15 MILLIGRAM(S): at 18:00

## 2017-12-03 RX ADMIN — Medication 975 MILLIGRAM(S): at 21:09

## 2017-12-03 RX ADMIN — LINACLOTIDE 145 MICROGRAM(S): 145 CAPSULE, GELATIN COATED ORAL at 06:30

## 2017-12-03 RX ADMIN — Medication 15 MILLIGRAM(S): at 09:26

## 2017-12-03 RX ADMIN — ONDANSETRON 4 MILLIGRAM(S): 8 TABLET, FILM COATED ORAL at 06:28

## 2017-12-03 RX ADMIN — Medication 101 MILLIGRAM(S): at 13:48

## 2017-12-03 RX ADMIN — HYDROMORPHONE HYDROCHLORIDE 0.5 MILLIGRAM(S): 2 INJECTION INTRAMUSCULAR; INTRAVENOUS; SUBCUTANEOUS at 22:05

## 2017-12-03 RX ADMIN — HEPARIN SODIUM 5000 UNIT(S): 5000 INJECTION INTRAVENOUS; SUBCUTANEOUS at 05:36

## 2017-12-03 RX ADMIN — HYDROMORPHONE HYDROCHLORIDE 0.5 MILLIGRAM(S): 2 INJECTION INTRAMUSCULAR; INTRAVENOUS; SUBCUTANEOUS at 12:05

## 2017-12-03 RX ADMIN — Medication 15 MILLIGRAM(S): at 23:15

## 2017-12-03 NOTE — PROGRESS NOTE ADULT - ASSESSMENT
46 yo F with hx right hemicolectomy presents with LUQ pain and is found to have narrowing at the level of the sigmoid.     1. SBO- likely due to dysmotility  - 11/25: CT - adynamic ileus  - 11/28: small bowel follow through performed  - GI following  - Linzess     2. Hx of Right hemicolectomy  - stable    3. Dispo: home 46 yo F with hx right hemicolectomy presents with LUQ pain and is found to have narrowing at the level of the sigmoid.     1. SBO- likely due to dysmotility  - 11/25: CT - adynamic ileus  - 11/28: small bowel follow through performed  - GI following  - Linzess   - CT enterography ___    2. Hx of Right hemicolectomy  - stable    3. Dispo: home

## 2017-12-03 NOTE — PROGRESS NOTE ADULT - ASSESSMENT
48 YO F with long standing history of obstructive symptoms p/w LUQ, nausea, and vomiting and was admitted to surgery for SBO. GI consulted for possible Hirschsprung's disease seen on OSH pathology.     # Abdominal pain, SBO, r/o Hirschsprung's disease  - Recommend decreasing Linaclotide to 145 mcg daily to decrease abdominal pain which may be associated with increased motility on medication  - ARM shows hypotensive anorectal sphincter pressures, weak augmentation of sphincter tone, pelvic floor dyssynergia, and mildly impaired intrarectal sensation  - Pt would likely benefit from biofeedback therapy    Discussed with attending Dr. Asad GOLDBERG following 48 YO F with long standing history of obstructive symptoms p/w LUQ, nausea, and vomiting and was admitted to surgery for SBO. GI consulted for possible Hirschsprung's disease seen on OSH pathology.     # Abdominal pain, SBO, r/o Hirschsprung's disease  - Recommend decreasing Linaclotide to 145 mcg daily to decrease abdominal pain which may be associated with increased motility on medication  - ARM shows hypotensive anorectal sphincter pressures, weak augmentation of sphincter tone, pelvic floor dyssynergia, and mildly impaired intrarectal sensation  - Pt would likely benefit from biofeedback therapy  - currently drinking contrast for her CT enterography to evaluate GI tract    Discussed with attending Dr. Asad GOLDBERG following

## 2017-12-03 NOTE — PROGRESS NOTE ADULT - ATTENDING COMMENTS
agree with above
pt seen examined  stable NAD states ok at this time (12-2-17;  11:20pm)  Abdom soft with min LUQ tenderness  ---> GI meds

## 2017-12-03 NOTE — PROGRESS NOTE ADULT - SUBJECTIVE AND OBJECTIVE BOX
Pt seen and examined at bedside  Reports that she is still having her usual abdominal pain - not worse than before, minimal relief with current Rx  Tolerating po intake  Denies emesis, diarrhea fever, chills,       MEDICATIONS:  MEDICATIONS  (STANDING):  acetaminophen   Tablet. 975 milliGRAM(s) Oral every 8 hours  dextrose 5% + sodium chloride 0.45%. 1000 milliLiter(s) (60 mL/Hr) IV Continuous <Continuous>  gabapentin 300 milliGRAM(s) Oral three times a day  heparin  Injectable 5000 Unit(s) SubCutaneous every 8 hours  ketorolac   Injectable 15 milliGRAM(s) IV Push every 6 hours  lidocaine   Patch 1 Patch Transdermal daily  linaclotide 145 MICROGram(s) Oral before breakfast    MEDICATIONS  (PRN):  diphenhydrAMINE  IVPB 25 milliGRAM(s) IV Intermittent every 12 hours PRN Itching  hydrocortisone 0.5% Cream 1 Application(s) Topical four times a day PRN Rash and/or Itching  ondansetron Injectable 4 milliGRAM(s) IV Push every 6 hours PRN Nausea      Allergies  No Known Allergies    Intolerances    Vital Signs Last 24 Hrs  T(C): 37 (03 Dec 2017 16:16), Max: 37.3 (03 Dec 2017 12:09)  T(F): 98.6 (03 Dec 2017 16:16), Max: 99.2 (03 Dec 2017 12:09)  HR: 102 (03 Dec 2017 16:16) (69 - 102)  BP: 109/78 (03 Dec 2017 16:16) (101/68 - 111/81)  BP(mean): --  RR: 16 (03 Dec 2017 16:16) (16 - 18)  SpO2: 97% (03 Dec 2017 16:16) (96% - 100%)    12-02 @ 07:01  -  12-03 @ 07:00  --------------------------------------------------------  IN: 1020 mL / OUT: 700 mL / NET: 320 mL    12-03 @ 07:01  -  12-03 @ 16:23  --------------------------------------------------------  IN: 240 mL / OUT: 650 mL / NET: -410 mL      PHYSICAL EXAM:  General: Well developed; well nourished; in no acute distress  HEENT: MMM, conjunctiva and sclera clear  Gastrointestinal: Soft, LUQ TTP with guarding non-distended; Midline scar well healed; No rebound  Extremities: Normal range of motion, No clubbing, cyanosis or edema    LABS:  CBC Full  -  ( 03 Dec 2017 07:17 )  WBC Count : 5.0 K/uL  Hemoglobin : 9.7 g/dL  Hematocrit : 32.0 %  Platelet Count - Automated : 274 K/uL  Mean Cell Volume : 78.6 fL  Mean Cell Hemoglobin : 23.8 pg  Mean Cell Hemoglobin Concentration : 30.3 g/dL    144  |  109<H>  |  4<L>  ----------------------------<  83  3.6   |  23  |  0.60    Ca    9.1      03 Dec 2017 07:17  Phos  3.3     12-03  Mg     2.3     12-03          RADIOLOGY & ADDITIONAL STUDIES (The following images were personally reviewed):

## 2017-12-03 NOTE — PROGRESS NOTE ADULT - SUBJECTIVE AND OBJECTIVE BOX
12/2 ON: Pt continues to complain of pain. 12/2 ON: Pt continues to complain of pain.     SUBJECTIVE: Patient seen and examined bedside by surgery team.    heparin  Injectable 5000 Unit(s) SubCutaneous every 8 hours      Vital Signs Last 24 Hrs  T(C): 37.1 (03 Dec 2017 20:08), Max: 37.3 (03 Dec 2017 12:09)  T(F): 98.8 (03 Dec 2017 20:08), Max: 99.2 (03 Dec 2017 12:09)  HR: 74 (03 Dec 2017 20:08) (69 - 102)  BP: 106/54 (03 Dec 2017 20:08) (103/69 - 111/81)  BP(mean): --  RR: 17 (03 Dec 2017 20:08) (16 - 17)  SpO2: 100% (03 Dec 2017 20:08) (96% - 100%)  I&O's Detail    02 Dec 2017 07:01  -  03 Dec 2017 07:00  --------------------------------------------------------  IN:    dextrose 5% + sodium chloride 0.45%.: 720 mL    Oral Fluid: 300 mL  Total IN: 1020 mL    OUT:    Voided: 700 mL  Total OUT: 700 mL    Total NET: 320 mL      03 Dec 2017 07:01  -  03 Dec 2017 21:49  --------------------------------------------------------  IN:    dextrose 5% + sodium chloride 0.45%.: 720 mL    Oral Fluid: 480 mL  Total IN: 1200 mL    OUT:    Voided: 1200 mL  Total OUT: 1200 mL    Total NET: 0 mL            Physical Exam  General: NAD, alert, interactive, appears comfortable  C/V: NSR  Pulm: Nonlabored breathing, no respiratory distress  Abd: softly distended, NT/ND.  Extrem: WWP, no edema, SCDs in place        LABS:                        9.7    5.0   )-----------( 274      ( 03 Dec 2017 07:17 )             32.0     12-03    144  |  109<H>  |  4<L>  ----------------------------<  83  3.6   |  23  |  0.60    Ca    9.1      03 Dec 2017 07:17  Phos  3.3     12-03  Mg     2.3     12-03            RADIOLOGY & ADDITIONAL STUDIES:

## 2017-12-04 LAB
ANION GAP SERPL CALC-SCNC: 12 MMOL/L — SIGNIFICANT CHANGE UP (ref 5–17)
BUN SERPL-MCNC: 5 MG/DL — LOW (ref 7–23)
CALCIUM SERPL-MCNC: 9.3 MG/DL — SIGNIFICANT CHANGE UP (ref 8.4–10.5)
CHLORIDE SERPL-SCNC: 111 MMOL/L — HIGH (ref 96–108)
CO2 SERPL-SCNC: 19 MMOL/L — LOW (ref 22–31)
CREAT SERPL-MCNC: 0.62 MG/DL — SIGNIFICANT CHANGE UP (ref 0.5–1.3)
GLUCOSE SERPL-MCNC: 97 MG/DL — SIGNIFICANT CHANGE UP (ref 70–99)
HCT VFR BLD CALC: 32.1 % — LOW (ref 34.5–45)
HGB BLD-MCNC: 10 G/DL — LOW (ref 11.5–15.5)
MAGNESIUM SERPL-MCNC: 2.2 MG/DL — SIGNIFICANT CHANGE UP (ref 1.6–2.6)
MCHC RBC-ENTMCNC: 24.2 PG — LOW (ref 27–34)
MCHC RBC-ENTMCNC: 31.2 G/DL — LOW (ref 32–36)
MCV RBC AUTO: 77.5 FL — LOW (ref 80–100)
PHOSPHATE SERPL-MCNC: 4 MG/DL — SIGNIFICANT CHANGE UP (ref 2.5–4.5)
PLATELET # BLD AUTO: 273 K/UL — SIGNIFICANT CHANGE UP (ref 150–400)
POTASSIUM SERPL-MCNC: 3.5 MMOL/L — SIGNIFICANT CHANGE UP (ref 3.5–5.3)
POTASSIUM SERPL-SCNC: 3.5 MMOL/L — SIGNIFICANT CHANGE UP (ref 3.5–5.3)
RBC # BLD: 4.14 M/UL — SIGNIFICANT CHANGE UP (ref 3.8–5.2)
RBC # FLD: 15.9 % — SIGNIFICANT CHANGE UP (ref 10.3–16.9)
SODIUM SERPL-SCNC: 142 MMOL/L — SIGNIFICANT CHANGE UP (ref 135–145)
WBC # BLD: 5.3 K/UL — SIGNIFICANT CHANGE UP (ref 3.8–10.5)
WBC # FLD AUTO: 5.3 K/UL — SIGNIFICANT CHANGE UP (ref 3.8–10.5)

## 2017-12-04 RX ORDER — GABAPENTIN 400 MG/1
600 CAPSULE ORAL THREE TIMES A DAY
Qty: 0 | Refills: 0 | Status: DISCONTINUED | OUTPATIENT
Start: 2017-12-04 | End: 2017-12-05

## 2017-12-04 RX ORDER — DIPHENHYDRAMINE HCL 50 MG
25 CAPSULE ORAL ONCE
Qty: 0 | Refills: 0 | Status: COMPLETED | OUTPATIENT
Start: 2017-12-04 | End: 2017-12-04

## 2017-12-04 RX ORDER — ACETAMINOPHEN 500 MG
1000 TABLET ORAL ONCE
Qty: 0 | Refills: 0 | Status: COMPLETED | OUTPATIENT
Start: 2017-12-04 | End: 2017-12-04

## 2017-12-04 RX ADMIN — HEPARIN SODIUM 5000 UNIT(S): 5000 INJECTION INTRAVENOUS; SUBCUTANEOUS at 21:16

## 2017-12-04 RX ADMIN — Medication 15 MILLIGRAM(S): at 05:06

## 2017-12-04 RX ADMIN — Medication 101 MILLIGRAM(S): at 02:27

## 2017-12-04 RX ADMIN — HEPARIN SODIUM 5000 UNIT(S): 5000 INJECTION INTRAVENOUS; SUBCUTANEOUS at 14:37

## 2017-12-04 RX ADMIN — GABAPENTIN 300 MILLIGRAM(S): 400 CAPSULE ORAL at 05:06

## 2017-12-04 RX ADMIN — Medication 400 MILLIGRAM(S): at 22:01

## 2017-12-04 RX ADMIN — GABAPENTIN 600 MILLIGRAM(S): 400 CAPSULE ORAL at 21:16

## 2017-12-04 RX ADMIN — ONDANSETRON 4 MILLIGRAM(S): 8 TABLET, FILM COATED ORAL at 02:27

## 2017-12-04 RX ADMIN — ONDANSETRON 4 MILLIGRAM(S): 8 TABLET, FILM COATED ORAL at 21:52

## 2017-12-04 RX ADMIN — Medication 975 MILLIGRAM(S): at 14:37

## 2017-12-04 RX ADMIN — Medication 15 MILLIGRAM(S): at 06:16

## 2017-12-04 RX ADMIN — LINACLOTIDE 145 MICROGRAM(S): 145 CAPSULE, GELATIN COATED ORAL at 06:54

## 2017-12-04 RX ADMIN — Medication 101 MILLIGRAM(S): at 15:40

## 2017-12-04 RX ADMIN — LIDOCAINE 1 PATCH: 4 CREAM TOPICAL at 14:37

## 2017-12-04 RX ADMIN — GABAPENTIN 600 MILLIGRAM(S): 400 CAPSULE ORAL at 14:37

## 2017-12-04 RX ADMIN — Medication 25 MILLIGRAM(S): at 21:16

## 2017-12-04 RX ADMIN — Medication 975 MILLIGRAM(S): at 16:07

## 2017-12-04 RX ADMIN — Medication 975 MILLIGRAM(S): at 05:07

## 2017-12-04 RX ADMIN — Medication 975 MILLIGRAM(S): at 06:08

## 2017-12-04 RX ADMIN — Medication 1000 MILLIGRAM(S): at 22:25

## 2017-12-04 RX ADMIN — HEPARIN SODIUM 5000 UNIT(S): 5000 INJECTION INTRAVENOUS; SUBCUTANEOUS at 05:06

## 2017-12-04 NOTE — PROGRESS NOTE ADULT - ASSESSMENT
48 YO F with long standing history of obstructive symptoms p/w LUQ, nausea, and vomiting and was admitted to surgery for SBO. GI consulted for possible Hirschsprung's disease seen on OSH pathology.     # Abdominal pain, SBO, r/o Hirschsprung's disease  - C/w Linaclotide 145 mcg daily  - ARM shows hypotensive anorectal sphincter pressures, weak augmentation of sphincter tone, pelvic floor dyssynergia, and mildly impaired intrarectal sensation  - Pt would likely benefit from biofeedback therapy  - F/u CT enterography    Discussed with attending Dr. Kamara  GI following

## 2017-12-04 NOTE — PROGRESS NOTE ADULT - SUBJECTIVE AND OBJECTIVE BOX
Pain Management Progress Note - [facility] [phone]    Pt was seen at 9:10 am.     Interval history/subjective: Opioids were discontinued over the weekend by the primary team.     Pt reports that she is still experiencing abdominal pain. She reports nausea with food and states that she has had a liquid bowel movement this morning.     Pertinent PMH: Pain is ___ sharp ____dull ___burning __+_achy ___ Intensity: ____ mild ___mod __+_severe     Location: LUQ    Worse with palpation and movement.     Improved with ___+ _medication ____rest ____physical therapy      sodium chloride 0.9% Bolus  ondansetron Injectable  famotidine Injectable  aluminum hydroxide/magnesium hydroxide/simethicone Suspension  lidocaine 2% Viscous  morphine  - Injectable  morphine  - Injectable  (Floorstock)  iohexol 350 mG (iodine)/mL Oral Solution  iohexol 350 mG (iodine)/mL Oral Solution  heparin  Injectable  lactated ringers.  HYDROmorphone  Injectable  HYDROmorphone  Injectable  ondansetron Injectable  (ADM OVERRIDE)  HYDROmorphone  Injectable  (ADM OVERRIDE)  HYDROmorphone  Injectable  diphenhydrAMINE   Injectable  diatrizoate meglumine/diatrizoate sodium.  potassium chloride  10 mEq/100 mL IVPB  potassium phosphate IVPB  (ADM OVERRIDE)  diphenhydrAMINE   Injectable  diphenhydrAMINE   Injectable  diphenhydrAMINE   Injectable  potassium chloride  10 mEq/100 mL IVPB  potassium chloride  10 mEq/100 mL IVPB  lactated ringers  diphenhydrAMINE   Injectable  dextrose 5% + sodium chloride 0.45%.  diphenhydrAMINE   Injectable  potassium chloride  10 mEq/100 mL IVPB  hydrocortisone 0.5% Cream  (ADM OVERRIDE)  (ADM OVERRIDE)  HYDROmorphone  Injectable  acetaminophen  IVPB.  diphenhydrAMINE  IVPB  sodium biphosphate Rectal Enema  potassium chloride  10 mEq/100 mL IVPB  linaclotide  potassium chloride   Powder  ketorolac   Injectable  linaclotide  gabapentin  acetaminophen  IVPB.  ondansetron    Tablet  oxyCODONE    5 mG/acetaminophen 325 mG  HYDROmorphone  Injectable  lidocaine   Patch  acetaminophen   Tablet.  HYDROmorphone  Injectable  diatrizoate meglumine/diatrizoate sodium.  barium sulfate 2.1% Suspension  HYDROmorphone  Injectable  potassium chloride   Powder  barium sulfate 0.1% Suspension  diatrizoate meglumine/diatrizoate sodium.  HYDROmorphone  Injectable      ROS: Const:  _-__febrile   Eyes:_-__ENT:_-__CV: _-__chest pain  Resp: __-__sob  GI:__+_nausea _-__vomiting _+___abd pain ___npo __+_clears ___full diet _+_bm  :___ Musk: _-__pain _-__spasm  Skin:__-_ Neuro:  _-__odkrauih_-__etbubijfn__-__ numbness _+__weakness __-_paresthesia  Psych:_-__anxiety      12-04 @ 07:16364 mL/min/1.73M2    Hemoglobin: 10.0 g/dL (12-04 @ 07:58)  Hemoglobin: 9.7 g/dL (12-03 @ 07:17)        T(C): 36.7 (12-04-17 @ 05:40), Max: 37.3 (12-03-17 @ 12:09)  HR: 72 (12-04-17 @ 05:40) (72 - 102)  BP: 92/56 (12-04-17 @ 05:40) (92/56 - 111/81)  RR: 16 (12-04-17 @ 05:40) (16 - 17)  SpO2: 94% (12-04-17 @ 05:40) (94% - 100%)  Wt(kg): --     PHYSICAL EXAM:  Gen Appearance: _+__no acute distress (pt was initially sleeping and was easily arousable) _+__appropriate       Neuro: _+__SILT feet___+_ EOM Intact Psych: AAOX_3_, _+__mood/affect appropriate        Eyes: _+__conjunctiva WNL  ___+__ Pupils equal and round        ENT: __+_ears and nose atraumatic_+__ Hearing grossly intact        Neck: _+__trachea midline, no visible masses    Resp: _+__Nml WOB    Cardio: __+_extremities free from edema      GI/Abdomen: __+_soft, diffusely tender to light palpation; midline well healed scar intact     Skin/Wound: warm, dry, intact    Muscular: EHL __5_/5   _+__absent clubbing/cyanosis         ASSESSMENT:  This is a 47y old Female with a history of small bowel obstruction s/p colectomy and subsequent abdominal pain and dysmotility who presented with acute LUQ abdominal pain found to have evidence of adynamic ileus still in abdominal pain.     Recommended Treatment PLAN:    -agree with discontinuation of opioids   -suggest increasing gabapentin from 300mg TID to 600mg TID   -suggest continuing with Tylenol 975mg Q8hrs  -suggest continuing Lidoderm patch  -defer to GI for continued management of chronic dysmotility

## 2017-12-04 NOTE — CHART NOTE - NSCHARTNOTEFT_GEN_A_CORE
Admitting Diagnosis:   Patient is a 47y old  Female who presents with a chief complaint of Abdominal Pain (24 Nov 2017 20:44)      PAST MEDICAL & SURGICAL HISTORY:  No significant past medical history  History of colon resection      Current Nutrition Order: Clear Liquid diet    PO Intake: Good (%) [   ]  Fair (50-75%) [   ] Poor (<25%) [ x  ] Pt reports consuming 0% trays over last 3 days d/t pain and nausea.     GI Issues: Nausea    Pain: Pt complains of pain being 8-9 out of 10    Skin Integrity: Intact.    Labs:   12-04    142  |  111<H>  |  5<L>  ----------------------------<  97  3.5   |  19<L>  |  0.62    Ca    9.3      04 Dec 2017 07:58  Phos  4.0     12-04  Mg     2.2     12-04      Medications:  MEDICATIONS  (STANDING):  acetaminophen   Tablet. 975 milliGRAM(s) Oral every 8 hours  dextrose 5% + sodium chloride 0.45%. 1000 milliLiter(s) (60 mL/Hr) IV Continuous <Continuous>  gabapentin 300 milliGRAM(s) Oral three times a day  heparin  Injectable 5000 Unit(s) SubCutaneous every 8 hours  lidocaine   Patch 1 Patch Transdermal daily  linaclotide 145 MICROGram(s) Oral before breakfast    MEDICATIONS  (PRN):  diphenhydrAMINE  IVPB 25 milliGRAM(s) IV Intermittent every 12 hours PRN Itching  hydrocortisone 0.5% Cream 1 Application(s) Topical four times a day PRN Rash and/or Itching  ondansetron Injectable 4 milliGRAM(s) IV Push every 6 hours PRN Nausea    Weight: 110lbs  Height: 5'2", IBW 110lbs+/-10%, %%, BMI 20.1    Weight Change:   No apparent wt changes. Please obtain standing wt as pt has been NPO/Clears x11 days.    Estimated energy needs:   Needs adjusted 2/2 per labs and not meeting needs for extended period of time  1245-1494kcal/day (25-30kcal/kg)  60-70g pro/day (1.2-1.4g/kg)  1245-1494ml fluid/day (25-30ml/kg)    Subjective:   Pt has been NPO/Clears since 11/24. Spoke w/ team 11/29 about initiating alternate route of nutrition w/ recs. Currently ordered for clears however has not been consuming EnsureClears on trays. Pt appeared to be in severe discomfort at time of assessment. Reports pain is 8-9 out 10. Experiencing nausea, no emesis. Had small liquid BM today. Please see TPN recs below.     Previous Nutrition Diagnosis: inadequate oral intake RT diet order NPO x6days AEB pt meeting ~0% estimated needs x6 days.     Active [   ]  Resolved [ x  ] advanced to clears 11/27.    If resolved, new PES:   inadequate oral intake RT diet order NPO/Clears x11 days AEB pt meeting ~0% estimated needs x11 days.     Goal: Pt to meet >75% estimated needs via most appropriate route for nutrition.    Recommendations:   1) recommend initiating PN as primary route of nutrition as pt is not tolerating PO. Recommend TPN: 240g Dex, 60g AA, 50g lipids 3-4x/week (1556kcal, 1.2g pro/kg, GIR 3.3). Initiate w/in 24 hrs. Monitor triglycerides and adjust lipids per MD discretion.  2) Obtain standing wt    Education:    Pt is understanding of importance of meeting nutrient needs    Risk Level: High [ x  ] Moderate [   ] Low [   ] Admitting Diagnosis:   Patient is a 47y old  Female who presents with a chief complaint of Abdominal Pain (24 Nov 2017 20:44)      PAST MEDICAL & SURGICAL HISTORY:  No significant past medical history  History of colon resection      Current Nutrition Order: Clear Liquid diet    PO Intake: Good (%) [   ]  Fair (50-75%) [   ] Poor (<25%) [ x  ] Pt reports consuming 0% trays over last 3 days d/t pain and nausea.     GI Issues: Nausea    Pain: Pt complains of pain being 8-9 out of 10    Skin Integrity: Intact.    Labs:   12-04    142  |  111<H>  |  5<L>  ----------------------------<  97  3.5   |  19<L>  |  0.62    Ca    9.3      04 Dec 2017 07:58  Phos  4.0     12-04  Mg     2.2     12-04      Medications:  MEDICATIONS  (STANDING):  acetaminophen   Tablet. 975 milliGRAM(s) Oral every 8 hours  dextrose 5% + sodium chloride 0.45%. 1000 milliLiter(s) (60 mL/Hr) IV Continuous <Continuous>  gabapentin 300 milliGRAM(s) Oral three times a day  heparin  Injectable 5000 Unit(s) SubCutaneous every 8 hours  lidocaine   Patch 1 Patch Transdermal daily  linaclotide 145 MICROGram(s) Oral before breakfast    MEDICATIONS  (PRN):  diphenhydrAMINE  IVPB 25 milliGRAM(s) IV Intermittent every 12 hours PRN Itching  hydrocortisone 0.5% Cream 1 Application(s) Topical four times a day PRN Rash and/or Itching  ondansetron Injectable 4 milliGRAM(s) IV Push every 6 hours PRN Nausea    Weight: 110lbs  Height: 5'2", IBW 110lbs+/-10%, %%, BMI 20.1    Weight Change:   No apparent wt changes. Please obtain standing wt as pt has been NPO/Clears x11 days.    Estimated energy needs:   Needs adjusted 2/2 per labs and not meeting needs for extended period of time  1245-1494kcal/day (25-30kcal/kg)  60-70g pro/day (1.2-1.4g/kg)  1245-1494ml fluid/day (25-30ml/kg)    Subjective:   Pt has been NPO/Clears since 11/24. Spoke w/ team 11/29 about initiating alternate route of nutrition w/ recs. Currently ordered for clears however has not been consuming EnsureClears on trays. Pt appeared to be in severe discomfort at time of assessment. Reports pain is 8-9 out 10. Experiencing nausea, no emesis. Had small liquid BM today. Please see TPN recs below.     Previous Nutrition Diagnosis: inadequate oral intake RT diet order NPO x6days AEB pt meeting ~0% estimated needs x6 days.     Active [   ]  Resolved [ x  ] advanced to clears 11/27.    If resolved, new PES:   inadequate oral intake RT diet order NPO/Clears x11 days AEB pt meeting ~0% estimated needs x11 days.     Goal: Pt to meet >75% estimated needs via most appropriate route for nutrition.    Recommendations:   1) recommend initiating PN as primary route of nutrition as pt is not tolerating PO. Recommend TPN: 240g Dex, 60g AA, 50g lipids 3-4x/week (1556kcal, 1.2g pro/kg, GIR 3.3). Initiate w/in 24 hrs. Monitor triglycerides and adjust lipids per MD discretion.  2) Obtain standing wt  Recs discussed w/ team.    Education:    Pt is understanding of importance of meeting nutrient needs    Risk Level: High [ x  ] Moderate [   ] Low [   ]

## 2017-12-04 NOTE — PROGRESS NOTE ADULT - ASSESSMENT
47F w/ abdominal pain likely due to dysmotility disorder.  CT 11/25 showed an adynamic ileus.  Repeat CT enterography performed 12/3 for worsening abdominal tenderness showed similar picture.  Anorectal manometry showed impaired rectal tone and pelvic floor dyssynergia.   SBFT 11/28 showed slowed transit time.  Rectal biopsy @[facility] showed ganglion cells but was taken at unknown distance from anal verge.    1. presumed dysmotility disorder  - GI recommending biofeedback therapy  - linaclotide (agonist of guanylate cyclase 2C)    2. h/o R hemicolectomy for infectious process  - stable    3. Dispo: home

## 2017-12-04 NOTE — PROGRESS NOTE ADULT - SUBJECTIVE AND OBJECTIVE BOX
Pt seen and examined at bedside. HERBERT overnight. Pt reports persistent abdominal pain this AM, denies nausea, vomiting, melena, or hematochezia. Passing flatus and having liquid BMs.     Allergies  No Known Allergies    MEDICATIONS:  MEDICATIONS  (STANDING):  acetaminophen   Tablet. 975 milliGRAM(s) Oral every 8 hours  dextrose 5% + sodium chloride 0.45%. 1000 milliLiter(s) (60 mL/Hr) IV Continuous <Continuous>  gabapentin 300 milliGRAM(s) Oral three times a day  heparin  Injectable 5000 Unit(s) SubCutaneous every 8 hours  lidocaine   Patch 1 Patch Transdermal daily  linaclotide 145 MICROGram(s) Oral before breakfast    MEDICATIONS  (PRN):  diphenhydrAMINE  IVPB 25 milliGRAM(s) IV Intermittent every 12 hours PRN Itching  hydrocortisone 0.5% Cream 1 Application(s) Topical four times a day PRN Rash and/or Itching  ondansetron Injectable 4 milliGRAM(s) IV Push every 6 hours PRN Nausea    Vital Signs Last 24 Hrs  T(C): 36.7 (04 Dec 2017 05:40), Max: 37.3 (03 Dec 2017 12:09)  T(F): 98 (04 Dec 2017 05:40), Max: 99.2 (03 Dec 2017 12:09)  HR: 72 (04 Dec 2017 05:40) (72 - 102)  BP: 92/56 (04 Dec 2017 05:40) (92/56 - 111/81)  BP(mean): --  RR: 16 (04 Dec 2017 05:40) (16 - 17)  SpO2: 94% (04 Dec 2017 05:40) (94% - 100%)    12-03 @ 07:01 - 12-04 @ 07:00  --------------------------------------------------------  IN: 1920 mL / OUT: 1400 mL / NET: 520 mL    12-04 @ 07:01  -  12-04 @ 09:10  --------------------------------------------------------  IN: 60 mL / OUT: 0 mL / NET: 60 mL    PHYSICAL EXAM:    General: Well developed; well nourished; in no acute distress  HEENT: MMM, conjunctiva and sclera clear  Gastrointestinal: Soft non-tender non-distended; Normal bowel sounds; No hepatosplenomegaly. No rebound or guarding  Skin: Warm and dry. No obvious rash    LABS:                        10.0   5.3   )-----------( 273      ( 04 Dec 2017 07:58 )             32.1     12-04    142  |  111<H>  |  5<L>  ----------------------------<  97  3.5   |  19<L>  |  0.62    Ca    9.3      04 Dec 2017 07:58  Phos  4.0     12-04  Mg     2.2     12-04                        RADIOLOGY & ADDITIONAL STUDIES: Pt seen and examined at bedside. HERBERT overnight. Pt reports persistent abdominal pain this AM, denies nausea, vomiting, melena, or hematochezia. Passing flatus and having liquid BMs.     Allergies  No Known Allergies    MEDICATIONS:  MEDICATIONS  (STANDING):  acetaminophen   Tablet. 975 milliGRAM(s) Oral every 8 hours  dextrose 5% + sodium chloride 0.45%. 1000 milliLiter(s) (60 mL/Hr) IV Continuous <Continuous>  gabapentin 300 milliGRAM(s) Oral three times a day  heparin  Injectable 5000 Unit(s) SubCutaneous every 8 hours  lidocaine   Patch 1 Patch Transdermal daily  linaclotide 145 MICROGram(s) Oral before breakfast    MEDICATIONS  (PRN):  diphenhydrAMINE  IVPB 25 milliGRAM(s) IV Intermittent every 12 hours PRN Itching  hydrocortisone 0.5% Cream 1 Application(s) Topical four times a day PRN Rash and/or Itching  ondansetron Injectable 4 milliGRAM(s) IV Push every 6 hours PRN Nausea    Vital Signs Last 24 Hrs  T(C): 36.7 (04 Dec 2017 05:40), Max: 37.3 (03 Dec 2017 12:09)  T(F): 98 (04 Dec 2017 05:40), Max: 99.2 (03 Dec 2017 12:09)  HR: 72 (04 Dec 2017 05:40) (72 - 102)  BP: 92/56 (04 Dec 2017 05:40) (92/56 - 111/81)  BP(mean): --  RR: 16 (04 Dec 2017 05:40) (16 - 17)  SpO2: 94% (04 Dec 2017 05:40) (94% - 100%)    12-03 @ 07:01 - 12-04 @ 07:00  --------------------------------------------------------  IN: 1920 mL / OUT: 1400 mL / NET: 520 mL    12-04 @ 07:01  -  12-04 @ 09:10  --------------------------------------------------------  IN: 60 mL / OUT: 0 mL / NET: 60 mL    PHYSICAL EXAM:    General: Well developed; well nourished; in no acute distress  HEENT: MMM, conjunctiva and sclera clear  Gastrointestinal: Soft, LUQ TTP with guarding non-distended; Midline scar well healed; No rebound  Extremities: Normal range of motion, No clubbing, cyanosis or edema    LABS:                        10.0   5.3   )-----------( 273      ( 04 Dec 2017 07:58 )             32.1     12-04    142  |  111<H>  |  5<L>  ----------------------------<  97  3.5   |  19<L>  |  0.62    Ca    9.3      04 Dec 2017 07:58  Phos  4.0     12-04  Mg     2.2     12-04    RADIOLOGY & ADDITIONAL STUDIES:  pending CT enterography

## 2017-12-04 NOTE — PROGRESS NOTE ADULT - SUBJECTIVE AND OBJECTIVE BOX
o/n: HERBERT  12/3: CT enterography today o/n: HERBERT  12/3: CT enterography today    Vital Signs Last 24 Hrs  T(C): 36.4 (04 Dec 2017 12:48), Max: 37.1 (03 Dec 2017 20:08)  T(F): 97.6 (04 Dec 2017 12:48), Max: 98.8 (03 Dec 2017 20:08)  HR: 90 (04 Dec 2017 12:48) (72 - 102)  BP: 96/69 (04 Dec 2017 12:48) (92/56 - 109/78)  BP(mean): --  RR: 17 (04 Dec 2017 12:48) (16 - 17)  SpO2: 100% (04 Dec 2017 12:48) (94% - 100%)    I&O's Summary    03 Dec 2017 07:01  -  04 Dec 2017 07:00  --------------------------------------------------------  IN: 1920 mL / OUT: 1400 mL / NET: 520 mL    04 Dec 2017 07:01  -  04 Dec 2017 14:09  --------------------------------------------------------  IN: 60 mL / OUT: 600 mL / NET: -540 mL        SUBJECTIVE: Pt seen and examined bedside by chief resident.    Pain: [ ] YES [ ] NO  Pain (0-10):              Pain Control Adequate: [ ] YES [ ] NO  SOB: [ ]YES [ ] NO  Chest Discomfort: [ ] YES [ ] NO    Nausea: [ ] YES [ ] NO           Vomiting: [ ] YES [ ] NO  Flatus: [ ] YES [ ] NO             Bowel Movement: [ ] YES [ ] NO     Void: [ ]YES [ ]No    Physical Exam:  General Appearance: Appears well, NAD  Neck: Supple  Chest: Equal expansion bilaterally, equal breath sounds  CV: Pulse regular presently  Abdomen: Soft, nontense, appropriate incisional tenderness, dressings clean and dry and intact  Extremities: Grossly symmetric, SCD's in place     LABS:                        10.0   5.3   )-----------( 273      ( 04 Dec 2017 07:58 )             32.1       142  |  111<H>  |  5<L>  ----------------------------<  97  3.5   |  19<L>  |  0.62    Ca    9.3      04 Dec 2017 07:58  Phos  4.0     12-04  Mg     2.2     12-04 o/n: HERBERT  12/3: CT enterography today    Vital Signs Last 24 Hrs  T(C): 36.4 (04 Dec 2017 12:48), Max: 37.1 (03 Dec 2017 20:08)  T(F): 97.6 (04 Dec 2017 12:48), Max: 98.8 (03 Dec 2017 20:08)  HR: 90 (04 Dec 2017 12:48) (72 - 102)  BP: 96/69 (04 Dec 2017 12:48) (92/56 - 109/78)  BP(mean): --  RR: 17 (04 Dec 2017 12:48) (16 - 17)  SpO2: 100% (04 Dec 2017 12:48) (94% - 100%)    I&O's Summary    03 Dec 2017 07:01  -  04 Dec 2017 07:00  --------------------------------------------------------  IN: 1920 mL / OUT: 1400 mL / NET: 520 mL    04 Dec 2017 07:01  -  04 Dec 2017 14:09  --------------------------------------------------------  IN: 60 mL / OUT: 600 mL / NET: -540 mL        SUBJECTIVE: Pt seen and examined bedside by chief resident.    Pain: [x] YES [ ] NO  Pain (0-10):              Pain Control Adequate: [ ] YES [x] NO  SOB: [ ]YES [x] NO  Chest Discomfort: [ ] YES [x] NO    Nausea: [ ] YES [ ] NO           Vomiting: [ ] YES [ ] NO  Flatus: [ ] YES [ ] NO             Bowel Movement: [ ] YES [ ] NO     Void: [x]YES [ ]No    Physical Exam:  General Appearance: Appears well, NAD  Neck: Supple  Chest: Equal expansion bilaterally, equal breath sounds  CV: Pulse regular presently  Abdomen: Soft, nontense, appropriate incisional tenderness, dressings clean and dry and intact  Extremities: Grossly symmetric, SCD's in place     LABS:                        10.0   5.3   )-----------( 273      ( 04 Dec 2017 07:58 )             32.1       142  |  111<H>  |  5<L>  ----------------------------<  97  3.5   |  19<L>  |  0.62    Ca    9.3      04 Dec 2017 07:58  Phos  4.0     12-04  Mg     2.2     12-04

## 2017-12-05 ENCOUNTER — TRANSCRIPTION ENCOUNTER (OUTPATIENT)
Age: 47
End: 2017-12-05

## 2017-12-05 VITALS
RESPIRATION RATE: 16 BRPM | SYSTOLIC BLOOD PRESSURE: 95 MMHG | HEART RATE: 87 BPM | TEMPERATURE: 99 F | DIASTOLIC BLOOD PRESSURE: 70 MMHG | OXYGEN SATURATION: 98 %

## 2017-12-05 PROCEDURE — 85027 COMPLETE CBC AUTOMATED: CPT

## 2017-12-05 PROCEDURE — 96376 TX/PRO/DX INJ SAME DRUG ADON: CPT

## 2017-12-05 PROCEDURE — 96375 TX/PRO/DX INJ NEW DRUG ADDON: CPT

## 2017-12-05 PROCEDURE — 83690 ASSAY OF LIPASE: CPT

## 2017-12-05 PROCEDURE — 81001 URINALYSIS AUTO W/SCOPE: CPT

## 2017-12-05 PROCEDURE — 80053 COMPREHEN METABOLIC PANEL: CPT

## 2017-12-05 PROCEDURE — 84100 ASSAY OF PHOSPHORUS: CPT

## 2017-12-05 PROCEDURE — 82040 ASSAY OF SERUM ALBUMIN: CPT

## 2017-12-05 PROCEDURE — 74020: CPT

## 2017-12-05 PROCEDURE — 96374 THER/PROPH/DIAG INJ IV PUSH: CPT | Mod: XU

## 2017-12-05 PROCEDURE — 74177 CT ABD & PELVIS W/CONTRAST: CPT

## 2017-12-05 PROCEDURE — 93005 ELECTROCARDIOGRAM TRACING: CPT

## 2017-12-05 PROCEDURE — 74245: CPT

## 2017-12-05 PROCEDURE — 99285 EMERGENCY DEPT VISIT HI MDM: CPT | Mod: 25

## 2017-12-05 PROCEDURE — 85025 COMPLETE CBC W/AUTO DIFF WBC: CPT

## 2017-12-05 PROCEDURE — 84134 ASSAY OF PREALBUMIN: CPT

## 2017-12-05 PROCEDURE — 86850 RBC ANTIBODY SCREEN: CPT

## 2017-12-05 PROCEDURE — 36415 COLL VENOUS BLD VENIPUNCTURE: CPT

## 2017-12-05 PROCEDURE — 80048 BASIC METABOLIC PNL TOTAL CA: CPT

## 2017-12-05 PROCEDURE — 83735 ASSAY OF MAGNESIUM: CPT

## 2017-12-05 PROCEDURE — 81025 URINE PREGNANCY TEST: CPT

## 2017-12-05 PROCEDURE — 87086 URINE CULTURE/COLONY COUNT: CPT

## 2017-12-05 PROCEDURE — 85610 PROTHROMBIN TIME: CPT

## 2017-12-05 PROCEDURE — 71045 X-RAY EXAM CHEST 1 VIEW: CPT

## 2017-12-05 PROCEDURE — 83605 ASSAY OF LACTIC ACID: CPT

## 2017-12-05 PROCEDURE — 86900 BLOOD TYPING SEROLOGIC ABO: CPT

## 2017-12-05 PROCEDURE — 85730 THROMBOPLASTIN TIME PARTIAL: CPT

## 2017-12-05 PROCEDURE — 86901 BLOOD TYPING SEROLOGIC RH(D): CPT

## 2017-12-05 RX ORDER — GABAPENTIN 400 MG/1
1 CAPSULE ORAL
Qty: 60 | Refills: 0 | OUTPATIENT
Start: 2017-12-05 | End: 2017-12-25

## 2017-12-05 RX ORDER — ACETAMINOPHEN 500 MG
1000 TABLET ORAL ONCE
Qty: 0 | Refills: 0 | Status: COMPLETED | OUTPATIENT
Start: 2017-12-05 | End: 2017-12-05

## 2017-12-05 RX ADMIN — LIDOCAINE 1 PATCH: 4 CREAM TOPICAL at 02:00

## 2017-12-05 RX ADMIN — LINACLOTIDE 145 MICROGRAM(S): 145 CAPSULE, GELATIN COATED ORAL at 07:38

## 2017-12-05 RX ADMIN — GABAPENTIN 600 MILLIGRAM(S): 400 CAPSULE ORAL at 07:38

## 2017-12-05 RX ADMIN — SODIUM CHLORIDE 60 MILLILITER(S): 9 INJECTION, SOLUTION INTRAVENOUS at 04:27

## 2017-12-05 RX ADMIN — Medication 400 MILLIGRAM(S): at 05:43

## 2017-12-05 RX ADMIN — HEPARIN SODIUM 5000 UNIT(S): 5000 INJECTION INTRAVENOUS; SUBCUTANEOUS at 05:44

## 2017-12-05 RX ADMIN — Medication 101 MILLIGRAM(S): at 04:27

## 2017-12-05 RX ADMIN — Medication 1000 MILLIGRAM(S): at 06:15

## 2017-12-05 NOTE — PROGRESS NOTE ADULT - ASSESSMENT
47F w/ abdominal pain likely due to dysmotility disorder.  CT 11/25 showed an adynamic ileus.  Repeat CT enterography performed 12/3 for worsening abdominal tenderness showed similar picture.  Anorectal manometry showed impaired rectal tone and pelvic floor dyssynergia.   SBFT 11/28 showed slowed transit time.  Rectal biopsy @[facility] showed ganglion cells but was taken at unknown distance from anal verge.    1. presumed dysmotility disorder  - GI recommending biofeedback therapy  - linaclotide (agonist of guanylate cyclase 2C)  - pain managment following: no Opiods. Gabapentin 600 TID  - CT indicates adynamic ileus.    2. h/o L hemicolectomy for infectious process  - stable    3. Dispo: home

## 2017-12-05 NOTE — DISCHARGE NOTE ADULT - MEDICATION SUMMARY - MEDICATIONS TO TAKE
I will START or STAY ON the medications listed below when I get home from the hospital:    gabapentin 600 mg oral tablet  -- 1 tab(s) by mouth 3 times a day  -- Indication: For SMALL BOWEL OBSTRUCTION

## 2017-12-05 NOTE — DISCHARGE NOTE ADULT - CARE PLAN
Principal Discharge DX:	Generalized dysmotility of intestine  Goal:	Recovery / Maintenance  Instructions for follow-up, activity and diet:	You may advance your diet as you tolerate.   Contact your doctor or go to the ER for fever > 101.5, chills, nausea, vomiting, chest pain, shortness of breath, pain not controlled by medication or excessive bleeding.   Follow-up with the list of pain-management specialists given to you previously.   You were seen by pain management, and they recommended that you continue taking Gabapentin 600mg three times a day, with Tylenol 875mg Q8H.    We have prescribed a course of gabapentin for you to . However you will need to follow-up with an outpatient provider to obtain refills after your course has finished.

## 2017-12-05 NOTE — PROGRESS NOTE ADULT - SUBJECTIVE AND OBJECTIVE BOX
ON: continued abdominal pain. +flatus, +BM x3  12/4: No opiods, increased dose of gabapentin. will monitor for tolerating diet. D/c in AM. Pain management does not want to give opiods. ON: continued abdominal pain. +flatus, +BM x3  12/4: No opiods, increased dose of gabapentin. will monitor for tolerating diet. D/c in AM. Pain management does not want to give opiods.       SUBJECTIVE: Patient seen and examined bedside by chief resident. Pain has greatly improved    heparin  Injectable 5000 Unit(s) SubCutaneous every 8 hours      Vital Signs Last 24 Hrs  T(C): 36.7 (05 Dec 2017 05:03), Max: 37.3 (04 Dec 2017 20:26)  T(F): 98 (05 Dec 2017 05:03), Max: 99.1 (04 Dec 2017 20:26)  HR: 80 (05 Dec 2017 05:03) (80 - 90)  BP: 109/72 (05 Dec 2017 05:03) (96/69 - 121/77)  BP(mean): --  RR: 17 (05 Dec 2017 05:03) (16 - 17)  SpO2: 99% (05 Dec 2017 05:03) (99% - 100%)  I&O's Detail    04 Dec 2017 07:01  -  05 Dec 2017 07:00  --------------------------------------------------------  IN:    dextrose 5% + sodium chloride 0.45%.: 720 mL    IV PiggyBack: 200 mL  Total IN: 920 mL    OUT:    Voided: 600 mL  Total OUT: 600 mL    Total NET: 320 mL          General: NAD, resting comfortably in bed  C/V: NSR  Pulm: Nonlabored breathing, no respiratory distress  Abd: soft, NT/ND.  Extrem: WWP, no edema, SCDs in place        LABS:                        10.0   5.3   )-----------( 273      ( 04 Dec 2017 07:58 )             32.1     12-04    142  |  111<H>  |  5<L>  ----------------------------<  97  3.5   |  19<L>  |  0.62    Ca    9.3      04 Dec 2017 07:58  Phos  4.0     12-04  Mg     2.2     12-04            RADIOLOGY & ADDITIONAL STUDIES:

## 2017-12-05 NOTE — DISCHARGE NOTE ADULT - PATIENT PORTAL LINK FT
“You can access the [employer] Patient Portal, offered by [facility], by registering with the following website: [email or web address]”

## 2017-12-05 NOTE — DISCHARGE NOTE ADULT - CARE PROVIDER_API CALL
Jolene He), Surgery; Surgical Oncology  [address]  [address] [place] [address]  Phone: [phone]  Fax: [phone]

## 2017-12-05 NOTE — DISCHARGE NOTE ADULT - PLAN OF CARE
Recovery / Maintenance You may advance your diet as you tolerate.   Contact your doctor or go to the ER for fever > 101.5, chills, nausea, vomiting, chest pain, shortness of breath, pain not controlled by medication or excessive bleeding.   Follow-up with the list of pain-management specialists given to you previously.   You were seen by pain management, and they recommended that you continue taking Gabapentin 600mg three times a day, with Tylenol 875mg Q8H.    We have prescribed a course of gabapentin for you to . However you will need to follow-up with an outpatient provider to obtain refills after your course has finished.

## 2017-12-05 NOTE — DISCHARGE NOTE ADULT - HOSPITAL COURSE
Ms. Chauhan is a 47 F with a PMH of colonic resection 4 years ago, and SBO x2 treated medically. She presented with complaints of angela-umbilical non-radiating sharp pain and 12 hours of nausea and vomiting. On admission, she had intermittent diarrhea with resolution that evening. CT demonstrated narrowing of the small bowel at the level of anastomosis in the RUQ suggestive of a transition point, 3cm segment small bowel luminal narrowing in the LUQ, and a 10mm common bile duct dilatation. An UGI demonstrated delayed small bowel emptying. Anorectal manometry revealed impaired rectal tone and pelvic floor dyssynergia. Small bowel follow through showed >6hr transition time. On 11/30 Ms. Pain management was consulted on 12/4 and their recommendation was to increase the gabapentin dose for appropriate pain control. Ms. Chauhan is currently tolerating small amounts of fluid and solids. Her vital signs are stable and she is ready for discharge with follow-up at a pain management outpatient clinic.

## 2017-12-08 DIAGNOSIS — K56.609 UNSPECIFIED INTESTINAL OBSTRUCTION, UNSPECIFIED AS TO PARTIAL VERSUS COMPLETE OBSTRUCTION: ICD-10-CM

## 2017-12-08 DIAGNOSIS — K56.0 PARALYTIC ILEUS: ICD-10-CM

## 2017-12-08 DIAGNOSIS — Z90.49 ACQUIRED ABSENCE OF OTHER SPECIFIED PARTS OF DIGESTIVE TRACT: ICD-10-CM

## 2017-12-08 DIAGNOSIS — K50.10 CROHN'S DISEASE OF LARGE INTESTINE WITHOUT COMPLICATIONS: ICD-10-CM

## 2017-12-08 DIAGNOSIS — F11.988 OPIOID USE, UNSPECIFIED WITH OTHER OPIOID-INDUCED DISORDER: ICD-10-CM

## 2022-01-03 NOTE — ED ADULT NURSE NOTE - EXTENSIONS OF SELF_ADULT
None

## 2023-01-19 NOTE — DISCHARGE NOTE ADULT - FUNCTIONAL SCREEN CURRENT LEVEL: DRESSING, MLM
(0) independent Xolair Counseling:  Patient informed of potential adverse effects including but not limited to fever, muscle aches, rash and allergic reactions.  The patient verbalized understanding of the proper use and possible adverse effects of Xolair.  All of the patient's questions and concerns were addressed.